# Patient Record
Sex: FEMALE | Race: WHITE | Employment: FULL TIME | URBAN - METROPOLITAN AREA
[De-identification: names, ages, dates, MRNs, and addresses within clinical notes are randomized per-mention and may not be internally consistent; named-entity substitution may affect disease eponyms.]

---

## 2022-07-12 ENCOUNTER — HOSPITAL ENCOUNTER (INPATIENT)
Age: 67
LOS: 9 days | Discharge: HOME | DRG: 907 | End: 2022-07-22
Attending: EMERGENCY MEDICINE | Admitting: FAMILY MEDICINE
Payer: COMMERCIAL

## 2022-07-12 ENCOUNTER — APPOINTMENT (OUTPATIENT)
Dept: CT IMAGING | Age: 67
DRG: 907 | End: 2022-07-12
Attending: EMERGENCY MEDICINE
Payer: COMMERCIAL

## 2022-07-12 DIAGNOSIS — S36.039D LACERATION OF SPLEEN, SUBSEQUENT ENCOUNTER: ICD-10-CM

## 2022-07-12 DIAGNOSIS — S36.039A SPLENIC LACERATION, INITIAL ENCOUNTER: Primary | ICD-10-CM

## 2022-07-12 DIAGNOSIS — Z98.890 STATUS POST COLONOSCOPY: ICD-10-CM

## 2022-07-12 DIAGNOSIS — R10.12 ABDOMINAL PAIN, LUQ (LEFT UPPER QUADRANT): ICD-10-CM

## 2022-07-12 DIAGNOSIS — I26.93 SINGLE SUBSEGMENTAL PULMONARY EMBOLISM WITHOUT ACUTE COR PULMONALE (HCC): ICD-10-CM

## 2022-07-12 LAB
ALBUMIN SERPL-MCNC: 3.1 G/DL (ref 3.5–5)
ALBUMIN/GLOB SERPL: 1.2 {RATIO} (ref 1.1–2.2)
ALP SERPL-CCNC: 50 U/L (ref 45–117)
ALT SERPL-CCNC: 17 U/L (ref 12–78)
ANION GAP SERPL CALC-SCNC: 8 MMOL/L (ref 5–15)
AST SERPL-CCNC: 12 U/L (ref 15–37)
BASOPHILS # BLD: 0.1 K/UL (ref 0–0.1)
BASOPHILS NFR BLD: 0 % (ref 0–1)
BILIRUB SERPL-MCNC: 0.6 MG/DL (ref 0.2–1)
BUN SERPL-MCNC: 10 MG/DL (ref 6–20)
BUN/CREAT SERPL: 10 (ref 12–20)
CALCIUM SERPL-MCNC: 7.7 MG/DL (ref 8.5–10.1)
CHLORIDE SERPL-SCNC: 106 MMOL/L (ref 97–108)
CO2 SERPL-SCNC: 24 MMOL/L (ref 21–32)
CREAT SERPL-MCNC: 0.99 MG/DL (ref 0.55–1.02)
DIFFERENTIAL METHOD BLD: ABNORMAL
EOSINOPHIL # BLD: 0 K/UL (ref 0–0.4)
EOSINOPHIL NFR BLD: 0 % (ref 0–7)
ERYTHROCYTE [DISTWIDTH] IN BLOOD BY AUTOMATED COUNT: 13.4 % (ref 11.5–14.5)
ERYTHROCYTE [DISTWIDTH] IN BLOOD BY AUTOMATED COUNT: 13.4 % (ref 11.5–14.5)
GLOBULIN SER CALC-MCNC: 2.6 G/DL (ref 2–4)
GLUCOSE SERPL-MCNC: 187 MG/DL (ref 65–100)
HCT VFR BLD AUTO: 30.2 % (ref 35–47)
HCT VFR BLD AUTO: 35.9 % (ref 35–47)
HGB BLD-MCNC: 10.3 G/DL (ref 11.5–16)
HGB BLD-MCNC: 12 G/DL (ref 11.5–16)
IMM GRANULOCYTES # BLD AUTO: 0.1 K/UL (ref 0–0.04)
IMM GRANULOCYTES NFR BLD AUTO: 0 % (ref 0–0.5)
LYMPHOCYTES # BLD: 1.3 K/UL (ref 0.8–3.5)
LYMPHOCYTES NFR BLD: 10 % (ref 12–49)
MAGNESIUM SERPL-MCNC: 1.9 MG/DL (ref 1.6–2.4)
MCH RBC QN AUTO: 32.7 PG (ref 26–34)
MCH RBC QN AUTO: 33.6 PG (ref 26–34)
MCHC RBC AUTO-ENTMCNC: 33.4 G/DL (ref 30–36.5)
MCHC RBC AUTO-ENTMCNC: 34.1 G/DL (ref 30–36.5)
MCV RBC AUTO: 97.8 FL (ref 80–99)
MCV RBC AUTO: 98.4 FL (ref 80–99)
MONOCYTES # BLD: 0.4 K/UL (ref 0–1)
MONOCYTES NFR BLD: 3 % (ref 5–13)
NEUTS SEG # BLD: 11.7 K/UL (ref 1.8–8)
NEUTS SEG NFR BLD: 87 % (ref 32–75)
NRBC # BLD: 0 K/UL (ref 0–0.01)
NRBC # BLD: 0 K/UL (ref 0–0.01)
NRBC BLD-RTO: 0 PER 100 WBC
NRBC BLD-RTO: 0 PER 100 WBC
PLATELET # BLD AUTO: 302 K/UL (ref 150–400)
PLATELET # BLD AUTO: 351 K/UL (ref 150–400)
PMV BLD AUTO: 9.2 FL (ref 8.9–12.9)
PMV BLD AUTO: 9.6 FL (ref 8.9–12.9)
POTASSIUM SERPL-SCNC: 3.8 MMOL/L (ref 3.5–5.1)
PROT SERPL-MCNC: 5.7 G/DL (ref 6.4–8.2)
RBC # BLD AUTO: 3.07 M/UL (ref 3.8–5.2)
RBC # BLD AUTO: 3.67 M/UL (ref 3.8–5.2)
SODIUM SERPL-SCNC: 138 MMOL/L (ref 136–145)
TROPONIN-HIGH SENSITIVITY: 8 NG/L (ref 0–51)
WBC # BLD AUTO: 11 K/UL (ref 3.6–11)
WBC # BLD AUTO: 13.5 K/UL (ref 3.6–11)

## 2022-07-12 PROCEDURE — 83735 ASSAY OF MAGNESIUM: CPT

## 2022-07-12 PROCEDURE — 96375 TX/PRO/DX INJ NEW DRUG ADDON: CPT

## 2022-07-12 PROCEDURE — 96374 THER/PROPH/DIAG INJ IV PUSH: CPT

## 2022-07-12 PROCEDURE — 84484 ASSAY OF TROPONIN QUANT: CPT

## 2022-07-12 PROCEDURE — 74011250636 HC RX REV CODE- 250/636: Performed by: EMERGENCY MEDICINE

## 2022-07-12 PROCEDURE — 74011250636 HC RX REV CODE- 250/636: Performed by: INTERNAL MEDICINE

## 2022-07-12 PROCEDURE — 74011000636 HC RX REV CODE- 636: Performed by: EMERGENCY MEDICINE

## 2022-07-12 PROCEDURE — 86900 BLOOD TYPING SEROLOGIC ABO: CPT

## 2022-07-12 PROCEDURE — 36415 COLL VENOUS BLD VENIPUNCTURE: CPT

## 2022-07-12 PROCEDURE — 80053 COMPREHEN METABOLIC PANEL: CPT

## 2022-07-12 PROCEDURE — 85027 COMPLETE CBC AUTOMATED: CPT

## 2022-07-12 PROCEDURE — 74177 CT ABD & PELVIS W/CONTRAST: CPT

## 2022-07-12 PROCEDURE — G0378 HOSPITAL OBSERVATION PER HR: HCPCS

## 2022-07-12 PROCEDURE — 93005 ELECTROCARDIOGRAM TRACING: CPT

## 2022-07-12 PROCEDURE — 85025 COMPLETE CBC W/AUTO DIFF WBC: CPT

## 2022-07-12 PROCEDURE — 99285 EMERGENCY DEPT VISIT HI MDM: CPT

## 2022-07-12 RX ORDER — MORPHINE SULFATE 4 MG/ML
4 INJECTION INTRAVENOUS
Status: COMPLETED | OUTPATIENT
Start: 2022-07-12 | End: 2022-07-12

## 2022-07-12 RX ORDER — HYDROMORPHONE HYDROCHLORIDE 1 MG/ML
0.5 INJECTION, SOLUTION INTRAMUSCULAR; INTRAVENOUS; SUBCUTANEOUS ONCE
Status: COMPLETED | OUTPATIENT
Start: 2022-07-12 | End: 2022-07-12

## 2022-07-12 RX ORDER — KETOROLAC TROMETHAMINE 30 MG/ML
15 INJECTION, SOLUTION INTRAMUSCULAR; INTRAVENOUS
Status: COMPLETED | OUTPATIENT
Start: 2022-07-12 | End: 2022-07-12

## 2022-07-12 RX ORDER — ONDANSETRON 2 MG/ML
4 INJECTION INTRAMUSCULAR; INTRAVENOUS ONCE
Status: COMPLETED | OUTPATIENT
Start: 2022-07-12 | End: 2022-07-12

## 2022-07-12 RX ORDER — OLMESARTAN MEDOXOMIL 20 MG/1
TABLET ORAL
COMMUNITY
Start: 2022-05-12

## 2022-07-12 RX ORDER — DOXYCYCLINE HYCLATE 20 MG
TABLET ORAL
COMMUNITY
Start: 2022-07-11 | End: 2022-07-22

## 2022-07-12 RX ORDER — HYDROCHLOROTHIAZIDE 25 MG/1
25 TABLET ORAL DAILY
COMMUNITY
Start: 2022-05-12 | End: 2022-07-22

## 2022-07-12 RX ORDER — MORPHINE SULFATE 2 MG/ML
2 INJECTION, SOLUTION INTRAMUSCULAR; INTRAVENOUS
Status: DISCONTINUED | OUTPATIENT
Start: 2022-07-12 | End: 2022-07-13

## 2022-07-12 RX ORDER — AMLODIPINE BESYLATE 5 MG/1
5 TABLET ORAL DAILY
COMMUNITY
Start: 2022-05-12

## 2022-07-12 RX ORDER — ACETAMINOPHEN 325 MG/1
650 TABLET ORAL
Status: DISCONTINUED | OUTPATIENT
Start: 2022-07-12 | End: 2022-07-13 | Stop reason: SDUPTHER

## 2022-07-12 RX ORDER — SODIUM CHLORIDE, SODIUM LACTATE, POTASSIUM CHLORIDE, CALCIUM CHLORIDE 600; 310; 30; 20 MG/100ML; MG/100ML; MG/100ML; MG/100ML
150 INJECTION, SOLUTION INTRAVENOUS CONTINUOUS
Status: DISCONTINUED | OUTPATIENT
Start: 2022-07-12 | End: 2022-07-16

## 2022-07-12 RX ADMIN — HYDROMORPHONE HYDROCHLORIDE 0.5 MG: 1 INJECTION, SOLUTION INTRAMUSCULAR; INTRAVENOUS; SUBCUTANEOUS at 22:21

## 2022-07-12 RX ADMIN — MORPHINE SULFATE 4 MG: 4 INJECTION INTRAVENOUS at 20:49

## 2022-07-12 RX ADMIN — SODIUM CHLORIDE 1000 ML: 9 INJECTION, SOLUTION INTRAVENOUS at 20:12

## 2022-07-12 RX ADMIN — KETOROLAC TROMETHAMINE 15 MG: 30 INJECTION, SOLUTION INTRAMUSCULAR; INTRAVENOUS at 20:14

## 2022-07-12 RX ADMIN — ONDANSETRON 4 MG: 2 INJECTION INTRAMUSCULAR; INTRAVENOUS at 20:13

## 2022-07-12 RX ADMIN — IOPAMIDOL 100 ML: 755 INJECTION, SOLUTION INTRAVENOUS at 21:05

## 2022-07-12 RX ADMIN — SODIUM CHLORIDE, POTASSIUM CHLORIDE, SODIUM LACTATE AND CALCIUM CHLORIDE 150 ML/HR: 600; 310; 30; 20 INJECTION, SOLUTION INTRAVENOUS at 23:42

## 2022-07-12 RX ADMIN — SODIUM CHLORIDE 500 ML: 9 INJECTION, SOLUTION INTRAVENOUS at 22:20

## 2022-07-12 NOTE — ED PROVIDER NOTES
69-year-old female presents from home via private vehicle with a complaint of left upper quadrant and left shoulder pain. Symptoms started around 3:00 today immediately after waking up from a colonoscopy. She complains of pain before going home. She states they gave her some pain medicine through the IV and something to drink but this did not seem to help with her symptoms. Pain is continued throughout the afternoon. She took Tylenol at home with no relief. She called the on-call GI doctor today here and she was advised to come to the emergency department. She states the pain is located in the left upper quadrant with radiation to her left shoulder and her left arm. Pain is worsened when she takes deep breath and also when she moves as well as sitting in certain positions. Never had pain like this before. Denies any fever, cough, shortness of breath. She has had nausea but no vomiting. No bowel movement since the colonoscopy. She states that she knows she had some polyps removed but does not know further details than that. Colonoscopy was done at [facility] by Dr. Marcia Ch. Past Medical History:   Diagnosis Date    Hypertension        History reviewed. No pertinent surgical history. History reviewed. No pertinent family history.     Social History     Socioeconomic History    Marital status:      Spouse name: Not on file    Number of children: Not on file    Years of education: Not on file    Highest education level: Not on file   Occupational History    Not on file   Tobacco Use    Smoking status: Current Every Day Smoker    Smokeless tobacco: Never Used   Substance and Sexual Activity    Alcohol use: Yes     Comment: most days    Drug use: Yes     Types: Marijuana    Sexual activity: Not on file   Other Topics Concern    Not on file   Social History Narrative    Not on file     Social Determinants of Health     Financial Resource Strain:     Difficulty of Paying Living Expenses: Not on file   Food Insecurity:     Worried About Running Out of Food in the Last Year: Not on file    Ran Out of Food in the Last Year: Not on file   Transportation Needs:     Lack of Transportation (Medical): Not on file    Lack of Transportation (Non-Medical): Not on file   Physical Activity:     Days of Exercise per Week: Not on file    Minutes of Exercise per Session: Not on file   Stress:     Feeling of Stress : Not on file   Social Connections:     Frequency of Communication with Friends and Family: Not on file    Frequency of Social Gatherings with Friends and Family: Not on file    Attends Yazidism Services: Not on file    Active Member of [address] [place] [address] [email or web address] or Organizations: Not on file    Attends Club or Organization Meetings: Not on file    Marital Status: Not on file   Intimate Partner Violence:     Fear of Current or Ex-Partner: Not on file    Emotionally Abused: Not on file    Physically Abused: Not on file    Sexually Abused: Not on file   Housing Stability:     Unable to Pay for Housing in the Last Year: Not on file    Number of [place] in the Last Year: Not on file    Unstable Housing in the Last Year: Not on file         ALLERGIES: Penicillins    Review of Systems   Constitutional: Negative for fever. HENT: Negative for facial swelling. Eyes: Negative for visual disturbance. Respiratory: Negative for chest tightness. Cardiovascular: Negative for chest pain. Gastrointestinal: Positive for abdominal pain. Genitourinary: Negative for difficulty urinating and dysuria. Musculoskeletal: Negative for arthralgias. Skin: Negative for rash. Neurological: Negative for headaches. Hematological: Negative for adenopathy. Psychiatric/Behavioral: Negative for suicidal ideas.        Vitals:    07/12/22 2019 07/12/22 2035 07/12/22 2039 07/12/22 2107   BP:  105/74  112/87   Pulse: 82 99 100 96   Resp: 18 22 19 21   Temp:       SpO2: 99% 100% 98% 99% Weight:       Height:                Physical Exam  Vitals and nursing note reviewed. Constitutional:       General: She is not in acute distress. Appearance: She is well-developed. HENT:      Head: Normocephalic and atraumatic. Eyes:      General: No scleral icterus. Conjunctiva/sclera: Conjunctivae normal.      Pupils: Pupils are equal, round, and reactive to light. Cardiovascular:      Rate and Rhythm: Normal rate. Heart sounds: No murmur heard. Pulmonary:      Effort: Pulmonary effort is normal. No respiratory distress. Abdominal:      General: There is no distension. Musculoskeletal:         General: Normal range of motion. Cervical back: Normal range of motion and neck supple. Skin:     General: Skin is warm and dry. Findings: No rash. Neurological:      Mental Status: She is alert and oriented to person, place, and time. MDM  Number of Diagnoses or Management Options     Amount and/or Complexity of Data Reviewed  Clinical lab tests: reviewed  Tests in the radiology section of CPT®: reviewed  Tests in the medicine section of CPT®: reviewed      ED Course as of 07/12/22 2218 Tue Jul 12, 2022 2012 EKG, 12 LEAD, INITIAL  ED EKG interpretation:  Rhythm: normal sinus rhythm. Rate (approx.): 91. Axis: left. ST segment:  No concerning ST elevations or depressions. This EKG was interpreted by Tejas Tate MD,ED Provider. [JM]   0582 I spoke to interventional radiology as well as general surgery. They are in agreement that at this point, the patient just needs observation and repeat hemoglobin checks. If she continues to be stable most likely no further intervention is needed. If she does decompensate or her hemoglobin drops, IR may be able to intervene. General surgery was in agreement with this plan. I will discuss admission with the hospitalist service.  [JM]      ED Course User Index  [JM] Sasha Samuels MD     Perfect Serve Consult for Admission  10:18 PM    ED Room Number: SER02/02  Patient Name and age:  Jeevan Amis 74 y.o.  female  Working Diagnosis:   1. Splenic laceration, initial encounter    2. Status post colonoscopy    3. Abdominal pain, LUQ (left upper quadrant)        COVID-19 Suspicion:  no  Sepsis present:  no  Reassessment needed: no  Code Status:  Full Code  Readmission: no  Isolation Requirements:  no  Recommended Level of Care:  telemetry  Department: [place] [facility] - [phone]  Admitting Provider:     Other:  Had colonoscopy done earlier today. Having LUQ and L shoulder pain since then. Small splenic lac on CT scan. Hgb initially 12->10. VS stable. I spoke with IR and surgery who agreed on inpatient observation and q6 hr hemoglobin checks. IR could intervene if she continues to bleed. Jose Alejandro Orozco said he will follow. Total critical care time spent exclusive of procedures:  35 minutes.     Procedures

## 2022-07-13 LAB
ABO + RH BLD: NORMAL
ATRIAL RATE: 91 BPM
BLOOD GROUP ANTIBODIES SERPL: NORMAL
CALCULATED P AXIS, ECG09: 62 DEGREES
CALCULATED R AXIS, ECG10: -69 DEGREES
CALCULATED T AXIS, ECG11: 81 DEGREES
DIAGNOSIS, 93000: NORMAL
ERYTHROCYTE [DISTWIDTH] IN BLOOD BY AUTOMATED COUNT: 13.6 % (ref 11.5–14.5)
HCT VFR BLD AUTO: 22.3 % (ref 35–47)
HCT VFR BLD AUTO: 29.4 % (ref 35–47)
HGB BLD-MCNC: 7.6 G/DL (ref 11.5–16)
HGB BLD-MCNC: 8.3 G/DL (ref 11.5–16)
HGB BLD-MCNC: 8.6 G/DL (ref 11.5–16)
HGB BLD-MCNC: 9.8 G/DL (ref 11.5–16)
MCH RBC QN AUTO: 32.8 PG (ref 26–34)
MCHC RBC AUTO-ENTMCNC: 33.3 G/DL (ref 30–36.5)
MCV RBC AUTO: 98.3 FL (ref 80–99)
NRBC # BLD: 0 K/UL (ref 0–0.01)
NRBC BLD-RTO: 0 PER 100 WBC
P-R INTERVAL, ECG05: 150 MS
PLATELET # BLD AUTO: 297 K/UL (ref 150–400)
PMV BLD AUTO: 9.6 FL (ref 8.9–12.9)
Q-T INTERVAL, ECG07: 334 MS
QRS DURATION, ECG06: 80 MS
QTC CALCULATION (BEZET), ECG08: 410 MS
RBC # BLD AUTO: 2.99 M/UL (ref 3.8–5.2)
SPECIMEN EXP DATE BLD: NORMAL
VENTRICULAR RATE, ECG03: 91 BPM
WBC # BLD AUTO: 8.8 K/UL (ref 3.6–11)

## 2022-07-13 PROCEDURE — 85027 COMPLETE CBC AUTOMATED: CPT

## 2022-07-13 PROCEDURE — G0378 HOSPITAL OBSERVATION PER HR: HCPCS

## 2022-07-13 PROCEDURE — 87040 BLOOD CULTURE FOR BACTERIA: CPT

## 2022-07-13 PROCEDURE — 74011250636 HC RX REV CODE- 250/636: Performed by: INTERNAL MEDICINE

## 2022-07-13 PROCEDURE — 96376 TX/PRO/DX INJ SAME DRUG ADON: CPT

## 2022-07-13 PROCEDURE — 36415 COLL VENOUS BLD VENIPUNCTURE: CPT

## 2022-07-13 PROCEDURE — 51798 US URINE CAPACITY MEASURE: CPT

## 2022-07-13 PROCEDURE — 74011000250 HC RX REV CODE- 250: Performed by: FAMILY MEDICINE

## 2022-07-13 PROCEDURE — 74011250636 HC RX REV CODE- 250/636: Performed by: SURGERY

## 2022-07-13 PROCEDURE — 74011250636 HC RX REV CODE- 250/636: Performed by: EMERGENCY MEDICINE

## 2022-07-13 PROCEDURE — 65660000001 HC RM ICU INTERMED STEPDOWN

## 2022-07-13 PROCEDURE — 85018 HEMOGLOBIN: CPT

## 2022-07-13 PROCEDURE — 99221 1ST HOSP IP/OBS SF/LOW 40: CPT | Performed by: NURSE PRACTITIONER

## 2022-07-13 PROCEDURE — 85014 HEMATOCRIT: CPT

## 2022-07-13 RX ORDER — ONDANSETRON 2 MG/ML
4 INJECTION INTRAMUSCULAR; INTRAVENOUS
Status: DISCONTINUED | OUTPATIENT
Start: 2022-07-13 | End: 2022-07-22 | Stop reason: HOSPADM

## 2022-07-13 RX ORDER — SODIUM CHLORIDE 0.9 % (FLUSH) 0.9 %
5-40 SYRINGE (ML) INJECTION AS NEEDED
Status: DISCONTINUED | OUTPATIENT
Start: 2022-07-13 | End: 2022-07-22 | Stop reason: HOSPADM

## 2022-07-13 RX ORDER — MORPHINE SULFATE 4 MG/ML
4 INJECTION INTRAVENOUS
Status: COMPLETED | OUTPATIENT
Start: 2022-07-13 | End: 2022-07-13

## 2022-07-13 RX ORDER — SODIUM CHLORIDE 0.9 % (FLUSH) 0.9 %
5-40 SYRINGE (ML) INJECTION EVERY 8 HOURS
Status: DISCONTINUED | OUTPATIENT
Start: 2022-07-13 | End: 2022-07-22 | Stop reason: HOSPADM

## 2022-07-13 RX ORDER — ACETAMINOPHEN 325 MG/1
650 TABLET ORAL
Status: DISCONTINUED | OUTPATIENT
Start: 2022-07-13 | End: 2022-07-22 | Stop reason: HOSPADM

## 2022-07-13 RX ORDER — HYDROMORPHONE HYDROCHLORIDE 1 MG/ML
1 INJECTION, SOLUTION INTRAMUSCULAR; INTRAVENOUS; SUBCUTANEOUS
Status: DISCONTINUED | OUTPATIENT
Start: 2022-07-13 | End: 2022-07-18

## 2022-07-13 RX ADMIN — HYDROMORPHONE HYDROCHLORIDE 1 MG: 1 INJECTION, SOLUTION INTRAMUSCULAR; INTRAVENOUS; SUBCUTANEOUS at 22:50

## 2022-07-13 RX ADMIN — MORPHINE SULFATE 2 MG: 2 INJECTION, SOLUTION INTRAMUSCULAR; INTRAVENOUS at 06:56

## 2022-07-13 RX ADMIN — SODIUM CHLORIDE, POTASSIUM CHLORIDE, SODIUM LACTATE AND CALCIUM CHLORIDE 150 ML/HR: 600; 310; 30; 20 INJECTION, SOLUTION INTRAVENOUS at 15:00

## 2022-07-13 RX ADMIN — HYDROMORPHONE HYDROCHLORIDE 1 MG: 1 INJECTION, SOLUTION INTRAMUSCULAR; INTRAVENOUS; SUBCUTANEOUS at 16:13

## 2022-07-13 RX ADMIN — SODIUM CHLORIDE, PRESERVATIVE FREE 10 ML: 5 INJECTION INTRAVENOUS at 21:13

## 2022-07-13 RX ADMIN — SODIUM CHLORIDE, POTASSIUM CHLORIDE, SODIUM LACTATE AND CALCIUM CHLORIDE 150 ML/HR: 600; 310; 30; 20 INJECTION, SOLUTION INTRAVENOUS at 21:13

## 2022-07-13 RX ADMIN — HYDROMORPHONE HYDROCHLORIDE 1 MG: 1 INJECTION, SOLUTION INTRAMUSCULAR; INTRAVENOUS; SUBCUTANEOUS at 10:55

## 2022-07-13 RX ADMIN — SODIUM CHLORIDE, POTASSIUM CHLORIDE, SODIUM LACTATE AND CALCIUM CHLORIDE 150 ML/HR: 600; 310; 30; 20 INJECTION, SOLUTION INTRAVENOUS at 06:00

## 2022-07-13 RX ADMIN — MORPHINE SULFATE 4 MG: 4 INJECTION INTRAVENOUS at 03:59

## 2022-07-13 RX ADMIN — MORPHINE SULFATE 2 MG: 2 INJECTION, SOLUTION INTRAMUSCULAR; INTRAVENOUS at 01:08

## 2022-07-13 RX ADMIN — SODIUM CHLORIDE, PRESERVATIVE FREE 10 ML: 5 INJECTION INTRAVENOUS at 16:14

## 2022-07-13 NOTE — ED NOTES
Pt up to ED restroom ambulatory accompanied by RNs x2. Patient reports increased pain and anxiety after returning to ED room. Pt reports she was able to urinate large amount when ambulatory to restroom. ED MD made aware of increased pain. Med order to follow.

## 2022-07-13 NOTE — ED NOTES
Bedside and Verbal shift change report given to Nirmal Riggs RN (oncoming nurse) by Josey Lilly RN (offgoing nurse). Report included the following information SBAR, ED Summary, Intake/Output and MAR.

## 2022-07-13 NOTE — H&P
[address] [facility] Adult  Hospitalist Group  History and Physical    Date of Service:  7/13/2022  Primary Care Provider: Arzella Carrel, MD  Source of information: The patient    Chief Complaint: Abdominal Pain      History of Presenting Illness:   David Hernandes is a 74 y.o. female who presents with abdominal pain    Patient had colonoscopy done yesterday, started experiencing left-sided abdominal pain with left shoulder pain, symptoms started around 3 PM, immediately after waking up from colonoscopy, patient continued to have nausea associated with abdominal pain, she took some Tylenol at home, symptoms did not jasper, called her GI doctor and came to the ER, patient came to [facility], had imaging done and was found to have splenic laceration and was requested to be admitted to the hospitalist service, denies any other complaints or problems    The patient denies any headache, blurry vision, sore throat, trouble swallowing, trouble with speech, chest pain, SOB, cough, fever, chills, N/V/D, , urinary symptoms, constipation, recent travels, sick contacts, focal or generalized neurological symptoms, falls, injuries, rashes, contact with COVID-19 diagnosed patients, hematemesis, melena, hemoptysis, hematuria, rashes, denies starting any new medications and denies any other concerns or problems besides as mentioned above. REVIEW OF SYSTEMS:  A comprehensive review of systems was negative except for that written in the History of Present Illness. Past Medical History:   Diagnosis Date    Hypertension       History reviewed. No pertinent surgical history. Prior to Admission medications    Medication Sig Start Date End Date Taking? Authorizing Provider   amLODIPine (NORVASC) 5 mg tablet Take 5 mg by mouth daily. 5/12/22   Other, MD Eros   doxycycline (PERIOSTAT) 20 mg tablet  7/11/22   Eros Christy MD   hydroCHLOROthiazide (HYDRODIURIL) 25 mg tablet Take 25 mg by mouth daily.  5/12/22   Other, MD Eros   olmesartan (BENICAR) 20 mg tablet TAKE 1 TABLET BY MOUTH EVERY DAY FOR 90 DAYS 5/12/22   Other, MD Eros     Allergies   Allergen Reactions    Penicillins Unknown (comments)      History reviewed. No pertinent family history. Social History:  reports that she has been smoking. She has never used smokeless tobacco. She reports current alcohol use. She reports current drug use. Drug: Marijuana. Family and social history were personally reviewed, all pertinent and relevant details are outlined as above. Objective:     Visit Vitals  BP 96/62 (BP 1 Location: Right upper arm, BP Patient Position: Lying)   Pulse 91   Temp 98.4 °F (36.9 °C)   Resp 22   Ht 5' 3\" (1.6 m)   Wt 78.4 kg (172 lb 13.5 oz)   SpO2 95%   BMI 30.62 kg/m²    O2 Flow Rate (L/min): 2 l/min O2 Device: Nasal cannula    PHYSICAL EXAM:   General: Alert x oriented x 3, awake,  HEENT: PEERL, EOMI, moist mucus membranes  Neck: Supple, no JVD, no meningeal signs  Chest: Clear to auscultation bilaterally   CVS: RRR, S1 S2 heard, no murmurs/rubs/gallops  Abd: Soft, tender to palpation diffusely/no rebound/no guarding  Ext: No clubbing, no cyanosis, no edema  Neuro/Psych: Pleasant mood and affect, CN 2-12 grossly intact, sensory grossly within normal limit, Strength 5/5 in all extremities, DTR 1+ x 4  Cap refill: Brisk, less than 3 seconds  Pulses: 2+, symmetric in all extremities  Skin: Warm, dry, without rashes or lesions    Data Review: All diagnostic labs and studies have been reviewed. Abnormal Labs Reviewed   CBC WITH AUTOMATED DIFF - Abnormal; Notable for the following components:       Result Value    WBC 13.5 (*)     RBC 3.67 (*)     NEUTROPHILS 87 (*)     LYMPHOCYTES 10 (*)     MONOCYTES 3 (*)     ABS. NEUTROPHILS 11.7 (*)     ABS. IMM.  GRANS. 0.1 (*)     All other components within normal limits   METABOLIC PANEL, COMPREHENSIVE - Abnormal; Notable for the following components:    Glucose 187 (*)     BUN/Creatinine ratio 10 (*) GFR est non-AA 56 (*)     Calcium 7.7 (*)     AST (SGOT) 12 (*)     Protein, total 5.7 (*)     Albumin 3.1 (*)     All other components within normal limits   CBC W/O DIFF - Abnormal; Notable for the following components:    RBC 3.07 (*)     HGB 10.3 (*)     HCT 30.2 (*)     All other components within normal limits   CBC W/O DIFF - Abnormal; Notable for the following components:    RBC 2.99 (*)     HGB 9.8 (*)     HCT 29.4 (*)     All other components within normal limits   HEMOGLOBIN - Abnormal; Notable for the following components:    HGB 8.6 (*)     All other components within normal limits       All Micro Results     Procedure Component Value Units Date/Time    CULTURE, BLOOD, PAIRED [[id number]]     Order Status: Sent Specimen: Blood           IMAGING:   CT ABD PELV W CONT   Final Result   Small laceration in the anterior inferior spleen with active extravasation   extending into the left upper quadrant. There is a moderate amount of blood in   the abdomen that appears most dense adjacent to the spleen. No free air to   suggest colonic perforation. The findings were called to Dr. Bon Cruz on 7/12/2022 at 9:40 PM by myself. [id number]                 ECG/ECHO:    Results for orders placed or performed during the hospital encounter of 07/12/22   EKG, 12 LEAD, INITIAL   Result Value Ref Range    Ventricular Rate 91 BPM    Atrial Rate 91 BPM    P-R Interval 150 ms    QRS Duration 80 ms    Q-T Interval 334 ms    QTC Calculation (Bezet) 410 ms    Calculated P Axis 62 degrees    Calculated R Axis -69 degrees    Calculated T Axis 81 degrees    Diagnosis       Normal sinus rhythm with sinus arrhythmia  Possible Left atrial enlargement  Left axis deviation  Pulmonary disease pattern  Abnormal ECG  No previous ECGs available          Assessment:   Given the patient's current clinical presentation, there is a high level of concern for decompensation if discharged from the emergency department.  Complex decision making was performed, which includes reviewing the patient's available past medical records, laboratory results, and imaging studies. Splenic laceration  Acute anemia  Hypertension now hypotensive  Hypocalcemia  Plan:   Patient will be admitted on a telemetry bed, keep n.p.o., pain control, general surgery on board, monitor H&H, hemoglobin trending down, may need surgical intervention, defer to surgery, reassess as needed  Likely secondary to acute blood loss, transfuse as needed, may consider additional imaging if needed, monitor H&H and further intervention per hospital course, avoid antiplatelets  Patient with soft blood pressures, likely secondary to above, IV hydration, monitor, transfuse and optimize blood pressure as needed, hold all antihypertensives  Corrected calcium borderline low, will get ionized calcium level        DIET: DIET NPO   ISOLATION PRECAUTIONS: There are currently no Active Isolations  CODE STATUS: Full Code   DVT PROPHYLAXIS: SCDs  FUNCTIONAL STATUS PRIOR TO HOSPITALIZATION: Fully active and ambulatory; able to carry on all self-care without restriction. EARLY MOBILITY ASSESSMENT: Recommend routine ambulation while hospitalized with the assistance of nursing staff  ANTICIPATED DISCHARGE: 24-48 hours. CRITICAL CARE WAS PERFORMED FOR THIS ENCOUNTER: YES. I had a face to face encounter with the patient, reviewed and interpreted patient data including clinical events, labs, images, vital signs, I/O's, and examined patient. I have discussed the case and the plan and management of the patient's care with the consulting services, the bedside nurses and necessary ancillary providers. This patient has a high probability of imminent, clinically significant deterioration, which requires the highest level of preparedness to intervene urgently.  I participated in the decision-making and personally managed or directed the management of the following life and organ supporting interventions that required my frequent assessment to treat or prevent imminent deterioration. I personally spent 40 minutes of critical care time. This is time spent at this critically ill patient's bedside actively involved in patient care as well as the coordination of care and discussions with the patient's family. This does not include any procedural time which has been billed separately. Signed By: Maddy Ibarra MD     July 13, 2022         Please note that this dictation may have been completed with Dragon, the computer voice recognition software. Quite often unanticipated grammatical, syntax, homophones, and other interpretive errors are inadvertently transcribed by the computer software. Please disregard these errors. Please excuse any errors that have escaped final proofreading.

## 2022-07-13 NOTE — PROGRESS NOTES
Medicare Outpatient Observation Notice (MOON) provided to patient/representative with verbal explanation of the notice. Time allotted for questions regarding the notice. Patient /representative provided a completed copy of the MOON notice. Copy placed on bedside chart.   Marine Madrigal, Care Management Assistant

## 2022-07-13 NOTE — CONSULTS
INTERVENTIONAL RADIOLOGY  Consult Note      Patient:  Alexys Akbar  :  1955  Age:  74 y.o. MRN:  [id number]    Today's Date:  2022  Admission Date:  2022  Hospital Day:  0  Consult requested by:  Rhys Montana MD      CC / HPI   Alexys Akbar is a 74 y.o. female with a history of HTN and anxiety who presented to the ED with LUQ abdominal pain and left shoulder pain. Patient states symptoms began yesterday afternoon immediately following colonoscopy and continued throughout the evening. She spoke with the on-call GI physician and was advised to go to the ED for further evaluation and management. Pain is aggravated by deep breaths and movement. She endorses nausea but denies vomiting. Work-up with findings of small splenic laceration with extravasation into the LUQ. IR consultation is requested for possible embolization. PAST MEDICAL HISTORY  Past Medical History:   Diagnosis Date    Hypertension      PAST SURGICAL HISTORY  History reviewed. No pertinent surgical history. SOCIAL HISTORY  Social History     Socioeconomic History    Marital status:      Spouse name: Not on file    Number of children: Not on file    Years of education: Not on file    Highest education level: Not on file   Occupational History    Not on file   Tobacco Use    Smoking status: Current Every Day Smoker    Smokeless tobacco: Never Used   Substance and Sexual Activity    Alcohol use: Yes     Comment: most days    Drug use: Yes     Types: Marijuana    Sexual activity: Not on file   Other Topics Concern    Not on file   Social History Narrative    Not on file     Social Determinants of Health     Financial Resource Strain:     Difficulty of Paying Living Expenses: Not on file   Food Insecurity:     Worried About Running Out of Food in the Last Year: Not on file    Simin of Food in the Last Year: Not on file   Transportation Needs:     Lack of Transportation (Medical):  Not on file    Lack of Transportation (Non-Medical): Not on file   Physical Activity:     Days of Exercise per Week: Not on file    Minutes of Exercise per Session: Not on file   Stress:     Feeling of Stress : Not on file   Social Connections:     Frequency of Communication with Friends and Family: Not on file    Frequency of Social Gatherings with Friends and Family: Not on file    Attends Judaism Services: Not on file    Active Member of [address] [place] [address] or Organizations: Not on file    Attends Club or Organization Meetings: Not on file    Marital Status: Not on file   Intimate Partner Violence:     Fear of Current or Ex-Partner: Not on file    Emotionally Abused: Not on file    Physically Abused: Not on file    Sexually Abused: Not on file   Housing Stability:     Unable to Pay for Housing in the Last Year: Not on file    Number of [place] in the Last Year: Not on file    Unstable Housing in the Last Year: Not on file     FAMILY HISTORY  History reviewed. No pertinent family history.     CURRENT MEDICATIONS  Current Facility-Administered Medications   Medication Dose Route Frequency Provider Last Rate Last Admin    HYDROmorphone (DILAUDID) injection 1 mg  1 mg IntraVENous Q3H PRN Arslan Yarbrough MD   1 mg at 07/13/22 1055    ondansetron (ZOFRAN) injection 4 mg  4 mg IntraVENous Q4H PRN Arslan Yarbrough MD        sodium chloride (NS) flush 5-40 mL  5-40 mL IntraVENous Q8H Twan Whitehead MD        sodium chloride (NS) flush 5-40 mL  5-40 mL IntraVENous PRN Twan Whitehead MD        acetaminophen (TYLENOL) tablet 650 mg  650 mg Oral Q4H PRN Twan Whitehead MD        lactated Ringers infusion  150 mL/hr IntraVENous CONTINUOUS Jonel Camargo  mL/hr at 07/13/22 0600 150 mL/hr at 07/13/22 0600     ALLERGIES  Allergies   Allergen Reactions    Penicillins Unknown (comments)       DIAGNOSTIC STUDIES   IMAGING STUDIES  I personally reviewed the following imaging studies and reports:    CT Results (most recent):  Results from Hospital Encounter encounter on 07/12/22    CT ABD PELV W CONT    Narrative  EXAM: CT ABD PELV W CONT    INDICATION: LUQ abd pain, s/p colonoscopy today    COMPARISON: None    CONTRAST: 100 mL of Isovue-370. ORAL CONTRAST: None    TECHNIQUE:  Following the uneventful intravenous administration of contrast, thin axial  images were obtained through the abdomen and pelvis. Coronal and sagittal  reconstructions were generated. CT dose reduction was achieved through use of a  standardized protocol tailored for this examination and automatic exposure  control for dose modulation. FINDINGS:  LOWER THORAX: No significant abnormality in the incidentally imaged lower chest.  LIVER: No mass. BILIARY TREE: Gallbladder is within normal limits. CBD is not dilated. SPLEEN: There is a small area of hypodensity in the inferior anterior portion of  the spleen likely related to laceration. There is active extravasation extending  from this into the adjacent fluid as seen on series 3 image 27. The fluid  surrounding the spleen is hypodense related to hemorrhage. PANCREAS: No mass or ductal dilatation. ADRENALS: Unremarkable. KIDNEYS: No mass, calculus, or hydronephrosis. STOMACH: Unremarkable. SMALL BOWEL: No dilatation or wall thickening. COLON: No dilatation or wall thickening. Several colonic diverticula are  present. There appears to be a fat plane between the splenic flexure and the  adjacent blood in the left upper quadrant. No evidence of free air to suggest  perforation. APPENDIX: Not well seen. PERITONEUM: There is a moderate amount of fluid in the abdomen, greatest in the  left upper quadrant surrounding the spleen. Bright contrast is seen adjacent to  the spleen related to active bleeding. RETROPERITONEUM: No lymphadenopathy or aortic aneurysm. REPRODUCTIVE ORGANS: Unremarkable  URINARY BLADDER: No mass or calculus. BONES: No destructive bone lesion.   ABDOMINAL WALL: No mass or hernia. ADDITIONAL COMMENTS: N/A    Impression  Small laceration in the anterior inferior spleen with active extravasation  extending into the left upper quadrant. There is a moderate amount of blood in  the abdomen that appears most dense adjacent to the spleen. No free air to  suggest colonic perforation.     LABS  Lab Results   Component Value Date/Time    WBC 8.8 07/13/2022 03:51 AM    HGB 8.6 (L) 07/13/2022 11:05 AM    HCT 29.4 (L) 07/13/2022 03:51 AM    PLATELET 179 90/10/8657 03:51 AM    MCV 98.3 07/13/2022 03:51 AM     Lab Results   Component Value Date/Time    Sodium 138 07/12/2022 08:04 PM    Potassium 3.8 07/12/2022 08:04 PM    Chloride 106 07/12/2022 08:04 PM    CO2 24 07/12/2022 08:04 PM    Anion gap 8 07/12/2022 08:04 PM    Glucose 187 (H) 07/12/2022 08:04 PM    BUN 10 07/12/2022 08:04 PM    Creatinine 0.99 07/12/2022 08:04 PM    BUN/Creatinine ratio 10 (L) 07/12/2022 08:04 PM    GFR est AA >60 07/12/2022 08:04 PM    GFR est non-AA 56 (L) 07/12/2022 08:04 PM    Calcium 7.7 (L) 07/12/2022 08:04 PM     No results found for: INR, PTMR, PTP, PT1, PT2, INREXT       REVIEW OF SYSTEMS   (Positive findings are bolded, all others negative)  Constitutional:  Fever, Chills, Night sweats, Unintentional weight loss, Weight gain, Anorexia, Fatigue, Somnolence  Eyes:  Vision loss, Vision change, Eye pain, Redness, Discharge  ENT:  Otalgia, Otorrhea, Hearing loss, Tinnitus, Epistaxis, Rhinorrhea, Sinus Congestion, Sore throat, Oral lesions, Tooth pain, Bleeding gums, Dysphonia, Dysphagia, Neck pain  Cardiovascular:  Chest pain, Palpitations, Dyspnea on exertion, Orthopnea, Paroxysmal nocturnal dyspnea, Leg swelling, Claudication  Respiratory:  Cough, Sputum production, Hemoptysis, Wheezing, Snoring, Shortness of breath  Gastrointestinal:  Nausea, Vomiting, Abdominal pain, Dyspepsia, Diarrhea, Constipation, Hematochezia, Melena, Encopresis  Genitourinary:  Pelvic pain, Dysuria, Frequency, Urgency, Hematuria, Retention, Incontinence, Testicular pain, Scrotal mass / Swelling, Erectile dysfunction, Menorrhagia, Metrorrhagia, Postmenopausal bleeding, Dysmenorrhea, Discharge  Musculoskeletal:  Back pain, Joint pain, Joint swelling, Muscle pain, Muscle spasm  Integumentary:  Rash, Pruritus, Dryness, Discoloration, Non-healing sores / Open wounds, Breast lumps, Mastalgia, Galactorrhea, Alopecia  Neurological:  Headache, Weakness, Paresthesias, Memory loss, Seizures, Dizziness, Syncope, Ataxia, Tremor  Psychiatric:  Anxiety, Depression, Irritability, Insomnia, Paranoia, Hallucinations, Suicidal ideations  Endocrine:  Heat / Cold intolerance, Polydipsia, Polyphagia  Hematologic / Lymphatic:  Lymphadenopathy, Bleeding disorder  Allergic / Immunologic / Infectious:  Urticaria, Seasonal / Environmental allergies, Persistent / Recurrent infection      PHYSICAL EXAM   BP 96/62 (BP 1 Location: Right upper arm, BP Patient Position: Lying)   Pulse (!) 115   Temp 98.4 °F (36.9 °C)   Resp 22   Ht 5' 3\" (1.6 m)   Wt 78.4 kg (172 lb 13.5 oz)   SpO2 95%   BMI 30.62 kg/m²   General:  Calm, cooperative, NAD  HEENT:  NCAT, EOMI, conjunctiva clear, MMM  Heart:  RRR, S1S2 normal  Lungs:  CTAB, NWOB  Abdomen:  Soft, ND, LUQ TTP  Extremities:  MAEW, no cyanosis or edema  Skin:  Warm and dry, color normal, no rashes  Neurological:  AAOX3, speech clear and coherent      ASSESSMENT / PLAN   This is a 74 y.o. female with small splenic laceration following colonoscopy. Vitals appear stable  Long discussion with patient who would prefer to avoid invasive procedure if possible  Conservative management for now  Continue to trend H/H  Notify IR for any precipitous drop in Hgb or decline in clinical condition      Case discussed with Dr. Douglas Woodall. Thank you for asking us to participate in the care of this patient.       Mirta Rosales., USMAN  [facility]      CC:  Ken Miner MD

## 2022-07-13 NOTE — ED NOTES
Bedside shift change report given to [facility] (oncoming nurse) by Gretel Grubbs RN (offgoing nurse). Report included the following information SBAR, ED Summary, Intake/Output, MAR and Recent Results.

## 2022-07-13 NOTE — CONSULTS
[facility]  Inpatient Consultation        Admit Date: 7/12/2022  Reason for Consultation: splenic bleeding    HPI:  Yenny Elam is a 74 y.o. female whom we are asked to see in consultation by Dr. Gifty Whittington for the above complaint. Presented to [facility] ED with complaints of Left sided abdominal pain with Left shoulder pain. Symptoms started around 3:00pm yesterday immediately after waking up from a colonoscopy. She complained of pain before going home. She states they gave her some pain medicine through the IV and something to drink but this did not seem to help with her symptoms. Pain continued throughout the afternoon. She took Tylenol at home with no relief. She called the on-call GI doctor and she was advised to come to the emergency department. She states the pain is located in the left upper quadrant with radiation to her left shoulder and her left arm. Pain is worsened when she takes deep breath and also when she moves as well as sitting in certain positions. Never had pain like this before. Denies any fever, cough, shortness of breath. She has had nausea but no vomiting. No bowel movement since the colonoscopy. Colonoscopy was done at [facility] by Dr. Jamison Silverman on 7-12-22, where 8 polyps were removed one needed cautery. IMPRESSION- CT scan  Small laceration in the anterior inferior spleen with active extravasation  extending into the left upper quadrant. There is a moderate amount of blood in  the abdomen that appears most dense adjacent to the spleen. No free air to  suggest colonic perforation. Patient Active Problem List    Diagnosis Date Noted    Splenic laceration 07/12/2022     Past Medical History:   Diagnosis Date    Hypertension       History reviewed. No pertinent surgical history.    Social History     Tobacco Use    Smoking status: Current Every Day Smoker    Smokeless tobacco: Never Used   Substance Use Topics    Alcohol use: Yes     Comment: most days      History reviewed. No pertinent family history. Prior to Admission medications    Medication Sig Start Date End Date Taking? Authorizing Provider   amLODIPine (NORVASC) 5 mg tablet Take 5 mg by mouth daily. 22   OtherEros MD   doxycycline (PERIOSTAT) 20 mg tablet  22   Eros Christy MD   hydroCHLOROthiazide (HYDRODIURIL) 25 mg tablet Take 25 mg by mouth daily. 22   Eros Christy MD   olmesartan (BENICAR) 20 mg tablet TAKE 1 TABLET BY MOUTH EVERY DAY FOR 90 DAYS 22   Other, MD Eros     Current Facility-Administered Medications   Medication Dose Route Frequency    acetaminophen (TYLENOL) tablet 650 mg  650 mg Oral Q4H PRN    morphine injection 2 mg  2 mg IntraVENous Q4H PRN    lactated Ringers infusion  150 mL/hr IntraVENous CONTINUOUS     Allergies   Allergen Reactions    Penicillins Unknown (comments)          Subjective:     Review of Systems:    A comprehensive review of systems was negative except for that written in the History of Present Illness. Objective:     Blood pressure 104/69, pulse 89, temperature 98.5 °F (36.9 °C), resp. rate (!) 31, height 5' 3\" (1.6 m), weight 172 lb 13.5 oz (78.4 kg), SpO2 98 %. Temp (24hrs), Av.6 °F (37 °C), Min:98.5 °F (36.9 °C), Max:98.7 °F (37.1 °C)      Recent Labs     22  0351 22  2207 22   WBC 8.8 11.0 13.5*   HGB 9.8* 10.3* 12.0   HCT 29.4* 30.2* 35.9    302 351     Recent Labs     22      K 3.8      CO2 24   *   BUN 10   CREA 0.99   CA 7.7*   MG 1.9   ALB 3.1*   TBILI 0.6   ALT 17     No results for input(s): AML, LPSE in the last 72 hours. Intake/Output Summary (Last 24 hours) at 2022 1031  Last data filed at 2022 2343  Gross per 24 hour   Intake 1500 ml   Output --   Net 1500 ml       _____________________  Physical Exam:     General:  Alert, cooperative, no distress, appears stated age. Eyes:   Sclera clear. Throat: Lips, mucosa, and tongue normal.   Neck: Supple, symmetrical, trachea midline. Lungs:   Expiratory wheezes upper lobes   Heart:  Regular rate and rhythm. Abdomen:   Normal BS, flat, Distended and tender to light palpation worsened with a deep breath. Extremities: Extremities normal, atraumatic, no cyanosis or edema. Skin: Skin color, texture, turgor normal. No rashes or lesions. Assessment:   Active Problems:    Splenic laceration (7/12/2022)      Plan:   Continue IVFs  Sips of clear liquids  Serial H&H's-IR to see as well  IS hourly  Ambulate and OOB to chair      Thank you for allowing us to participate in the care of this patient. Total time spent with patient: 30 minutes. Signed By: Patience Alvarez     July 13, 2022        I agree with the assessment and plan by the student  Serial H/H (every 6hrs) and IR consult        ADDENDUM:  Lawence Rinne, MD  Pt was seen and examined. Pt was consulted for splenic hemorrhage after colonoscopy. Pt had likely a grade I tear from the splenic from the tension on the splenocolic ligament during colonoscopy. Pt is reporting pain with inspiration. No acute indication for operation. Recommend serial hemoglobin checks and pt possible need for IR coil embolization of the splenic artery if acute anemia worsen with hemodynamic changes. Diet as tolerated. Pain control.

## 2022-07-13 NOTE — ED NOTES
TRANSFER - OUT REPORT:    Verbal report given to Deb Zapien(name) on Diana Landeros  being transferred to [facility]) for routine progression of care       Report consisted of patients Situation, Background, Assessment and   Recommendations(SBAR). Information from the following report(s) SBAR was reviewed with the receiving nurse. Lines:   Peripheral IV 07/12/22 Left Antecubital (Active)        Opportunity for questions and clarification was provided.       Patient transported with:   Monitor  O2 @ 2 liters

## 2022-07-13 NOTE — ED NOTES
Pt awake and talking on cell phone with spouse at this time. Pt updated on ETA of transport and room number at [facility].

## 2022-07-14 ENCOUNTER — APPOINTMENT (OUTPATIENT)
Dept: CT IMAGING | Age: 67
DRG: 907 | End: 2022-07-14
Attending: SURGERY
Payer: COMMERCIAL

## 2022-07-14 ENCOUNTER — APPOINTMENT (OUTPATIENT)
Dept: CT IMAGING | Age: 67
DRG: 907 | End: 2022-07-14
Attending: STUDENT IN AN ORGANIZED HEALTH CARE EDUCATION/TRAINING PROGRAM
Payer: COMMERCIAL

## 2022-07-14 LAB
ALBUMIN SERPL-MCNC: 2.6 G/DL (ref 3.5–5)
ALBUMIN/GLOB SERPL: 1 {RATIO} (ref 1.1–2.2)
ALP SERPL-CCNC: 35 U/L (ref 45–117)
ALT SERPL-CCNC: 14 U/L (ref 12–78)
ANION GAP SERPL CALC-SCNC: 6 MMOL/L (ref 5–15)
AST SERPL-CCNC: 9 U/L (ref 15–37)
BASOPHILS # BLD: 0.1 K/UL (ref 0–0.1)
BASOPHILS NFR BLD: 1 % (ref 0–1)
BILIRUB SERPL-MCNC: 0.6 MG/DL (ref 0.2–1)
BUN SERPL-MCNC: 11 MG/DL (ref 6–20)
BUN/CREAT SERPL: 17 (ref 12–20)
CALCIUM SERPL-MCNC: 8 MG/DL (ref 8.5–10.1)
CHLORIDE SERPL-SCNC: 108 MMOL/L (ref 97–108)
CO2 SERPL-SCNC: 24 MMOL/L (ref 21–32)
CREAT SERPL-MCNC: 0.64 MG/DL (ref 0.55–1.02)
DIFFERENTIAL METHOD BLD: ABNORMAL
EOSINOPHIL # BLD: 0.1 K/UL (ref 0–0.4)
EOSINOPHIL NFR BLD: 2 % (ref 0–7)
ERYTHROCYTE [DISTWIDTH] IN BLOOD BY AUTOMATED COUNT: 13.4 % (ref 11.5–14.5)
GLOBULIN SER CALC-MCNC: 2.7 G/DL (ref 2–4)
GLUCOSE SERPL-MCNC: 100 MG/DL (ref 65–100)
HCT VFR BLD AUTO: 21.5 % (ref 35–47)
HCT VFR BLD AUTO: 22.4 % (ref 35–47)
HCT VFR BLD AUTO: 23.1 % (ref 35–47)
HGB BLD-MCNC: 7.2 G/DL (ref 11.5–16)
HGB BLD-MCNC: 7.7 G/DL (ref 11.5–16)
HGB BLD-MCNC: 7.7 G/DL (ref 11.5–16)
IMM GRANULOCYTES # BLD AUTO: 0 K/UL (ref 0–0.04)
IMM GRANULOCYTES NFR BLD AUTO: 0 % (ref 0–0.5)
LYMPHOCYTES # BLD: 2 K/UL (ref 0.8–3.5)
LYMPHOCYTES NFR BLD: 24 % (ref 12–49)
MCH RBC QN AUTO: 33.3 PG (ref 26–34)
MCHC RBC AUTO-ENTMCNC: 33.3 G/DL (ref 30–36.5)
MCV RBC AUTO: 100 FL (ref 80–99)
MONOCYTES # BLD: 0.7 K/UL (ref 0–1)
MONOCYTES NFR BLD: 9 % (ref 5–13)
NEUTS SEG # BLD: 5.5 K/UL (ref 1.8–8)
NEUTS SEG NFR BLD: 64 % (ref 32–75)
NRBC # BLD: 0 K/UL (ref 0–0.01)
NRBC BLD-RTO: 0 PER 100 WBC
PLATELET # BLD AUTO: 222 K/UL (ref 150–400)
PMV BLD AUTO: 9.7 FL (ref 8.9–12.9)
POTASSIUM SERPL-SCNC: 3.7 MMOL/L (ref 3.5–5.1)
PROT SERPL-MCNC: 5.3 G/DL (ref 6.4–8.2)
RBC # BLD AUTO: 2.31 M/UL (ref 3.8–5.2)
SODIUM SERPL-SCNC: 138 MMOL/L (ref 136–145)
WBC # BLD AUTO: 8.5 K/UL (ref 3.6–11)

## 2022-07-14 PROCEDURE — 74011000636 HC RX REV CODE- 636: Performed by: RADIOLOGY

## 2022-07-14 PROCEDURE — 65660000001 HC RM ICU INTERMED STEPDOWN

## 2022-07-14 PROCEDURE — 99231 SBSQ HOSP IP/OBS SF/LOW 25: CPT | Performed by: SURGERY

## 2022-07-14 PROCEDURE — 74011000636 HC RX REV CODE- 636: Performed by: STUDENT IN AN ORGANIZED HEALTH CARE EDUCATION/TRAINING PROGRAM

## 2022-07-14 PROCEDURE — 85018 HEMOGLOBIN: CPT

## 2022-07-14 PROCEDURE — 74011250637 HC RX REV CODE- 250/637: Performed by: STUDENT IN AN ORGANIZED HEALTH CARE EDUCATION/TRAINING PROGRAM

## 2022-07-14 PROCEDURE — 74011250636 HC RX REV CODE- 250/636: Performed by: INTERNAL MEDICINE

## 2022-07-14 PROCEDURE — 80053 COMPREHEN METABOLIC PANEL: CPT

## 2022-07-14 PROCEDURE — 74174 CTA ABD&PLVS W/CONTRAST: CPT

## 2022-07-14 PROCEDURE — 74011250637 HC RX REV CODE- 250/637: Performed by: FAMILY MEDICINE

## 2022-07-14 PROCEDURE — 85025 COMPLETE CBC W/AUTO DIFF WBC: CPT

## 2022-07-14 PROCEDURE — 36415 COLL VENOUS BLD VENIPUNCTURE: CPT

## 2022-07-14 PROCEDURE — 71275 CT ANGIOGRAPHY CHEST: CPT

## 2022-07-14 PROCEDURE — 74011000250 HC RX REV CODE- 250: Performed by: FAMILY MEDICINE

## 2022-07-14 RX ORDER — IBUPROFEN 200 MG
1 TABLET ORAL DAILY
Status: DISCONTINUED | OUTPATIENT
Start: 2022-07-14 | End: 2022-07-22 | Stop reason: HOSPADM

## 2022-07-14 RX ORDER — BUTALBITAL, ACETAMINOPHEN AND CAFFEINE 50; 325; 40 MG/1; MG/1; MG/1
1 TABLET ORAL
Status: DISCONTINUED | OUTPATIENT
Start: 2022-07-14 | End: 2022-07-22 | Stop reason: HOSPADM

## 2022-07-14 RX ADMIN — ACETAMINOPHEN 650 MG: 325 TABLET ORAL at 04:05

## 2022-07-14 RX ADMIN — ACETAMINOPHEN 650 MG: 325 TABLET ORAL at 23:57

## 2022-07-14 RX ADMIN — SODIUM CHLORIDE, PRESERVATIVE FREE 10 ML: 5 INJECTION INTRAVENOUS at 22:37

## 2022-07-14 RX ADMIN — BUTALBITAL, ACETAMINOPHEN, AND CAFFEINE 1 TABLET: 50; 325; 40 TABLET ORAL at 20:32

## 2022-07-14 RX ADMIN — SODIUM CHLORIDE, PRESERVATIVE FREE 10 ML: 5 INJECTION INTRAVENOUS at 06:20

## 2022-07-14 RX ADMIN — SODIUM CHLORIDE, POTASSIUM CHLORIDE, SODIUM LACTATE AND CALCIUM CHLORIDE 150 ML/HR: 600; 310; 30; 20 INJECTION, SOLUTION INTRAVENOUS at 20:09

## 2022-07-14 RX ADMIN — IOPAMIDOL 100 ML: 755 INJECTION, SOLUTION INTRAVENOUS at 10:41

## 2022-07-14 RX ADMIN — ACETAMINOPHEN 650 MG: 325 TABLET ORAL at 09:45

## 2022-07-14 RX ADMIN — IOPAMIDOL 70 ML: 755 INJECTION, SOLUTION INTRAVENOUS at 16:09

## 2022-07-14 RX ADMIN — BUTALBITAL, ACETAMINOPHEN, AND CAFFEINE 1 TABLET: 50; 325; 40 TABLET ORAL at 12:24

## 2022-07-14 RX ADMIN — SODIUM CHLORIDE, POTASSIUM CHLORIDE, SODIUM LACTATE AND CALCIUM CHLORIDE 150 ML/HR: 600; 310; 30; 20 INJECTION, SOLUTION INTRAVENOUS at 04:05

## 2022-07-14 NOTE — PROGRESS NOTES
[address] [facility]  Hospitalist Group                                                                                          Hospitalist Progress Note  Rosie Harding MD  Answering service: [phone] OR 36 from in house phone        Date of Service:  2022  NAME:  Jenny Lopez  :  1955  MRN:  [id number]      Admission Summary:   HPI: Bhaskar Whalen is a 74 y.o. female who presents with abdominal pain     Patient had colonoscopy done yesterday, started experiencing left-sided abdominal pain with left shoulder pain, symptoms started around 3 PM, immediately after waking up from colonoscopy, patient continued to have nausea associated with abdominal pain, she took some Tylenol at home, symptoms did not jasper, called her GI doctor and came to the ER, patient came to [facility], had imaging done and was found to have splenic laceration and was requested to be admitted to the hospitalist service, denies any other complaints or problems\"       Interval history / Subjective:   Seen examined at bedside. Abdominal pain improved, was a bit frustated at bedside but easily pacified when being talked to about plan      Assessment & Plan:     Splenic Lac  Acute blood loss anemia  Acute PE?  LLL  Hypocalcemia   -appreciate surgery and IR, no acute  intervention at this point cont supportive care   -CTA abdomen pelvis showed so improvement in lac and peritoneum however noted possible left lower lobe PE?  -Obtain CTA of the chest stat, discussed with surgery may need IVC filter if confirmed PE, would be indicated to start ac given splenic lac   -Continue to trend H&H's, transfuse for hemoglobin less than 7  -ca improved s/p full mentation continue to monitor  -follow cultures     updated over the phone       Code status: full   Prophylaxis: [address] discussed with: patient/family, nurse, consulted  Anticipated Disposition: > 48 hrs      Hospital Problems  Never Reviewed Codes Class Noted POA    Splenic laceration ICD-10-CM: T36.474G  ICD-9-CM: 865.09  7/12/2022 Unknown                Review of Systems:   A comprehensive review of systems was negative except for that written in the HPI. Vital Signs:    Last 24hrs VS reviewed since prior progress note. Most recent are:  Visit Vitals  /68 (BP 1 Location: Right upper arm, BP Patient Position: At rest)   Pulse 99   Temp 98.3 °F (36.8 °C)   Resp 18   Ht 5' 3\" (1.6 m)   Wt 75 kg (165 lb 5.5 oz)   SpO2 98%   BMI 29.29 kg/m²         Intake/Output Summary (Last 24 hours) at 7/14/2022 1529  Last data filed at 7/14/2022 1311  Gross per 24 hour   Intake --   Output 750 ml   Net -750 ml        Physical Examination:     I had a face to face encounter with this patient and independently examined them on 7/14/2022 as outlined below:          Constitutional:  No acute distress, cooperative, pleasant    ENT:  Oral mucosa moist, oropharynx benign. Resp:  CTA bilaterally. No wheezing/rhonchi/rales. No accessory muscle use. CV:  Regular rhythm, normal rate, no murmurs, gallops, rubs    GI:  Soft, non distended, non tender. normoactive bowel sounds, no hepatosplenomegaly     Musculoskeletal:  No edema, warm, 2+ pulses throughout    Neurologic:  Moves all extremities. AAOx3, CN II-XII reviewed            Data Review:    Review and/or order of clinical lab test  Review and/or order of tests in the radiology section of CPT  Review and/or order of tests in the medicine section of CPT      Labs:     Recent Labs     07/14/22 0423 07/13/22  2220 07/13/22  1105 07/13/22  0351   WBC 8.5  --   --  8.8   HGB 7.7* 7.6*   < > 9.8*   HCT 23.1* 22.3*  --  29.4*     --   --  297    < > = values in this interval not displayed.      Recent Labs     07/14/22 0423 07/12/22 2004    138   K 3.7 3.8    106   CO2 24 24   BUN 11 10   CREA 0.64 0.99    187*   CA 8.0* 7.7*   MG  --  1.9     Recent Labs     07/14/22  042 07/12/22 2004   ALT 14 17   AP 35* 50   TBILI 0.6 0.6   TP 5.3* 5.7*   ALB 2.6* 3.1*   GLOB 2.7 2.6     No results for input(s): INR, PTP, APTT, INREXT in the last 72 hours. No results for input(s): FE, TIBC, PSAT, FERR in the last 72 hours. No results found for: FOL, RBCF   No results for input(s): PH, PCO2, PO2 in the last 72 hours. No results for input(s): CPK, CKNDX, TROIQ in the last 72 hours.     No lab exists for component: CPKMB  No results found for: CHOL, CHOLX, CHLST, CHOLV, HDL, HDLP, LDL, LDLC, DLDLP, TGLX, TRIGL, TRIGP, CHHD, CHHDX  No results found for: GLUCPOC  No results found for: COLOR, APPRN, SPGRU, REFSG, FELY, PROTU, GLUCU, KETU, BILU, UROU, KAITLIN, LEUKU, GLUKE, EPSU, BACTU, WBCU, RBCU, CASTS, UCRY      Medications Reviewed:     Current Facility-Administered Medications   Medication Dose Route Frequency    butalbital-acetaminophen-caffeine (FIORICET, ESGIC) -40 mg per tablet 1 Tablet  1 Tablet Oral Q4H PRN    HYDROmorphone (DILAUDID) injection 1 mg  1 mg IntraVENous Q3H PRN    ondansetron (ZOFRAN) injection 4 mg  4 mg IntraVENous Q4H PRN    sodium chloride (NS) flush 5-40 mL  5-40 mL IntraVENous Q8H    sodium chloride (NS) flush 5-40 mL  5-40 mL IntraVENous PRN    acetaminophen (TYLENOL) tablet 650 mg  650 mg Oral Q4H PRN    lactated Ringers infusion  150 mL/hr IntraVENous CONTINUOUS     ______________________________________________________________________  EXPECTED LENGTH OF STAY: 2d 4h  ACTUAL LENGTH OF STAY:          1                 Sunil Ruiz MD

## 2022-07-14 NOTE — PROGRESS NOTES
Progress Note    Patient: Deonte Boothe MRN: [id number]  SSN: [id number]    YOB: 1955  Age: 74 y.o. Sex: female      Admit Date: 2022    Splenic laceration    Subjective:     No acute surgical issues. Pt noted to have ongoing downward trend in hemoglobin. CTA was performed which showed no active bleeding from splenic laceration. A left PE was noted on CT scan. Pt overall reported she is doing better today than yesterday. Objective:     Visit Vitals  /68 (BP 1 Location: Right upper arm, BP Patient Position: At rest)   Pulse 99   Temp 98.3 °F (36.8 °C)   Resp 18   Ht 5' 3\" (1.6 m)   Wt 165 lb 5.5 oz (75 kg)   SpO2 98%   BMI 29.29 kg/m²       Temp (24hrs), Av.6 °F (37 °C), Min:98.3 °F (36.8 °C), Max:98.9 °F (37.2 °C)      Physical Exam:    Gen:  NAD  Pulm:  Unlabored  Abd:  S/ND/mild TTP in LUQ    Recent Results (from the past 24 hour(s))   HEMOGLOBIN    Collection Time: 22  4:18 PM   Result Value Ref Range    HGB 8.3 (L) 11.5 - 16.0 g/dL   HGB & HCT    Collection Time: 22 10:20 PM   Result Value Ref Range    HGB 7.6 (L) 11.5 - 16.0 g/dL    HCT 22.3 (L) 35.0 - 78.3 %   METABOLIC PANEL, COMPREHENSIVE    Collection Time: 22  4:23 AM   Result Value Ref Range    Sodium 138 136 - 145 mmol/L    Potassium 3.7 3.5 - 5.1 mmol/L    Chloride 108 97 - 108 mmol/L    CO2 24 21 - 32 mmol/L    Anion gap 6 5 - 15 mmol/L    Glucose 100 65 - 100 mg/dL    BUN 11 6 - 20 MG/DL    Creatinine 0.64 0.55 - 1.02 MG/DL    BUN/Creatinine ratio 17 12 - 20      GFR est AA >60 >60 ml/min/1.73m2    GFR est non-AA >60 >60 ml/min/1.73m2    Calcium 8.0 (L) 8.5 - 10.1 MG/DL    Bilirubin, total 0.6 0.2 - 1.0 MG/DL    ALT (SGPT) 14 12 - 78 U/L    AST (SGOT) 9 (L) 15 - 37 U/L    Alk.  phosphatase 35 (L) 45 - 117 U/L    Protein, total 5.3 (L) 6.4 - 8.2 g/dL    Albumin 2.6 (L) 3.5 - 5.0 g/dL    Globulin 2.7 2.0 - 4.0 g/dL    A-G Ratio 1.0 (L) 1.1 - 2.2     CBC WITH AUTOMATED DIFF    Collection Time: 07/14/22  4:23 AM   Result Value Ref Range    WBC 8.5 3.6 - 11.0 K/uL    RBC 2.31 (L) 3.80 - 5.20 M/uL    HGB 7.7 (L) 11.5 - 16.0 g/dL    HCT 23.1 (L) 35.0 - 47.0 %    .0 (H) 80.0 - 99.0 FL    MCH 33.3 26.0 - 34.0 PG    MCHC 33.3 30.0 - 36.5 g/dL    RDW 13.4 11.5 - 14.5 %    PLATELET 547 934 - 692 K/uL    MPV 9.7 8.9 - 12.9 FL    NRBC 0.0 0  WBC    ABSOLUTE NRBC 0.00 0.00 - 0.01 K/uL    NEUTROPHILS 64 32 - 75 %    LYMPHOCYTES 24 12 - 49 %    MONOCYTES 9 5 - 13 %    EOSINOPHILS 2 0 - 7 %    BASOPHILS 1 0 - 1 %    IMMATURE GRANULOCYTES 0 0.0 - 0.5 %    ABS. NEUTROPHILS 5.5 1.8 - 8.0 K/UL    ABS. LYMPHOCYTES 2.0 0.8 - 3.5 K/UL    ABS. MONOCYTES 0.7 0.0 - 1.0 K/UL    ABS. EOSINOPHILS 0.1 0.0 - 0.4 K/UL    ABS. BASOPHILS 0.1 0.0 - 0.1 K/UL    ABS. IMM. GRANS. 0.0 0.00 - 0.04 K/UL    DF AUTOMATED           Assessment:     Hospital Problems  Never Reviewed          Codes Class Noted POA    Splenic laceration ICD-10-CM: O60.942X  ICD-9-CM: 865.09  7/12/2022 Unknown              Plan/Recommendations/Medical Decision Making:     - Splenic laceration:  No acute indication for operation. CTA showed no active bleeding. She will need serial hemoglobin checks and likely blood transfusion if hemoglobin continues to drop  - CTA of chest to evaluate for extent of PE  - As she has splenic hemorrhage, anticoagulation will be contraindicated. Will need IVC filter placed.   Awaiting results of chest CTA  - NPO with ice chips for now

## 2022-07-14 NOTE — PROGRESS NOTES
INTERVENTIONAL RADIOLOGY  Progress Note      Patient:  Aly Nicole  :  1955  Age:  74 y.o. MRN:  [id number]    Today's Date:  2022  Admission Date:  2022  Hospital Day:  1      SUBJECTIVE   Aly Nicole is a 74 y.o. female with a history of small splenic laceration following colonoscopy. Patient reports abdominal pain improved. CTA a/p today redemonstrates parenchymal laceration at the anterior inferior spleen; however, bleeding appears to have resolved. New incidental finding of left lower lobe pulmonary embolus. OBJECTIVE   IMAGING STUDIES  I personally reviewed the following imaging studies:    CT Results:  Results from Hospital Encounter encounter on 22    CTA ABD PELV W WO CONT    Narrative  EXAM:  CTA ABD PELV W WO CONT    INDICATION: Evaluate for splenic hemorrhage    COMPARISON: CT abdomen/pelvis dated 2022. CONTRAST: 100 mL of Isovue-370    TECHNIQUE:  Multislice helical CT arteriography was performed from the diaphragm to the  symphysis pubis during uneventful rapid bolus intravenous contrast  administration. Precontrast images of the abdomen and pelvis were also acquired  No oral contrast was administered. Contiguous 2.5 mm axial images were  reconstructed and lung and soft tissue windows were generated. Coronal and  sagittal reformations and 3-D shaded surface display renderings were generated. CT dose reduction was achieved through use of a standardized protocol tailored  for this examination and automatic exposure control for dose modulation. Coronal  maximum intensity projection images were also acquired. CTA FINDINGS:  Redemonstrated laceration at the anterior inferior spleen with moderate sized  perisplenic hematoma. Previously seen active bleeding has resolved in the  interval. Blood products extend from the left upper quadrant and around the  paracolic gutter into the pelvis.  Small amount of blood products in the right  paracolic gutter as well.  No abdominal aortic aneurysm. Celiac and SMA are patent. Replaced right hepatic artery noted arising from the  SMA. Patent renal arteries. Patent LEMUEL. Bilateral iliac and common femoral  arteries are patent. Bilateral internal iliac arteries are patent. OTHER FINDINGS:  VISUALIZED THORAX: There is a small PE in a left lower lobe pulmonary artery  (series 6 image 1). Trace pleural effusion on the right with atelectasis of the  right lower lobe and to a lesser extent left lower lobe. LIVER: No mass. BILIARY TREE: Hyperdense material within the gallbladder likely represents  vicarious contrast excretion. CBD is prominent in size. SPLEEN: As above. PANCREAS: No mass or ductal dilatation. ADRENALS: Small right adrenal nodule measuring 7.6 Hounsfield units, likely  adenoma. KIDNEYS: No mass, calculus, or hydronephrosis. STOMACH: Unremarkable. SMALL BOWEL: No dilatation or wall thickening. COLON: No dilatation or wall thickening. Clips noted in the right colon. APPENDIX: Not well seen. PERITONEUM: Hemoperitoneum as above. RETROPERITONEUM: No lymphadenopathy or aortic aneurysm. REPRODUCTIVE ORGANS: No abnormal adnexal mass seen. URINARY BLADDER: No mass or calculus. BONES: No destructive bone lesion. ABDOMINAL WALL: No mass or hernia. ADDITIONAL COMMENTS: N/A    Impression  1. Redemonstrated parenchymal laceration at the anterior inferior spleen. Previously demonstrated active bleeding has resolved in the interval. Overall,  the volume of hemoperitoneum is slightly decreased in the interval.  2.  Likely new pulmonary embolus in the left lower lobe. Recommend dedicated  chest CTA for more complete evaluation.     LABS  Lab Results   Component Value Date/Time    WBC 8.5 07/14/2022 04:23 AM    HGB 7.7 (L) 07/14/2022 04:23 AM    HCT 23.1 (L) 07/14/2022 04:23 AM    PLATELET 071 44/44/0690 04:23 AM    .0 (H) 07/14/2022 04:23 AM     Lab Results   Component Value Date/Time    Sodium 138 07/14/2022 04:23 AM    Potassium 3.7 07/14/2022 04:23 AM    Chloride 108 07/14/2022 04:23 AM    CO2 24 07/14/2022 04:23 AM    Anion gap 6 07/14/2022 04:23 AM    Glucose 100 07/14/2022 04:23 AM    BUN 11 07/14/2022 04:23 AM    Creatinine 0.64 07/14/2022 04:23 AM    BUN/Creatinine ratio 17 07/14/2022 04:23 AM    GFR est AA >60 07/14/2022 04:23 AM    GFR est non-AA >60 07/14/2022 04:23 AM    Calcium 8.0 (L) 07/14/2022 04:23 AM     No results found for: INR, PTMR, PTP, PT1, PT2, INREXT    PHYSICAL EXAM  /68 (BP 1 Location: Right upper arm, BP Patient Position: At rest)   Pulse 99   Temp 98.3 °F (36.8 °C)   Resp 18   Ht 5' 3\" (1.6 m)   Wt 75 kg (165 lb 5.5 oz)   SpO2 98%   BMI 29.29 kg/m²   General:  NAD  Heart:  RRR  Lungs:  NWOB  Abdomen:  Soft, ND, LUQ mildly TTP  Neurological:  AAOX3      ASSESSMENT / PLAN   Continue to trend H/H  Dedicated CTA chest recommended  May need IVC filter      Lauren Wiggins NP  [facility]

## 2022-07-14 NOTE — PROGRESS NOTES
Transition of Care Plan  RUR 10%    Interventional Radiology Consulted     Disposition   Home with     Transportation       Medical follow up   PCP and specialist    Contact   Mariam Colby  [phone]    Reason for Admission:  Splenic Laceration--abdominal pain  Hx colonoscopy 7/12/22  , hypertension                   RUR Score:     19%                Plan for utilizing home health:      Not indicated   Will arrange if ordered  No hx of [facility]    PCP: First and Last name:  Indra Aguilar MD     Name of Practice:    Are you a current patient: Yes/No: yes   Approximate date of last visit: recently    Can you participate in a virtual visit with your PCP: yes                    Current Advanced Directive/Advance Care Plan: Full Code      Healthcare Decision Maker:   Click here to complete [address] including selection of the Healthcare Decision Maker Relationship (ie \"Primary\")                          Transition of Care Plan:   Home with  and medical follow up    CM talked with  and he confirmed demographics, PCP and insurance-- Medicare Part A-- [address] through employer. No preferred pharmacy    Patient and  live in two level home-- patient was self care, driving, working full time and using no dme prior to admission. The couple have 2 children-- son in [place]-- daughter out of town  Patient is an  for [employer]     Patient plans to return home when discharged. CM to follow to assist with any needs that arise. Care Management Interventions  Mode of Transport at Discharge:  (car )  Transition of Care Consult (CM Consult):  Other  Discharge Durable Medical Equipment: No  Physical Therapy Consult: No  Occupational Therapy Consult: No  Speech Therapy Consult: No  Support Systems: Other (Comment),Spouse/Significant Other,Child(jose luis),Other Family Member(s)  Confirm Follow Up Transport: Family (self )  Discharge Location  Patient Expects to be Discharged to[de-identified] Home

## 2022-07-14 NOTE — PROGRESS NOTES
Patient upgraded to Inpatient status. Medicare pt has received, reviewed, and signed 1st IM letter informing them of their right to appeal the discharge. Signed copy has been placed on pt bedside chart.

## 2022-07-14 NOTE — PROGRESS NOTES
Physician Progress Note      Harmony Cross  CSN #:                  [id number]  :                       1955  ADMIT DATE:       2022 7:47 PM  [address] [place] DATE:  RESPONDING  PROVIDER #:        Jaron Mittal MD          QUERY TEXT:    Pt admitted with splenic laceration and underwent colonoscopy .? If possible, please document in progress notes and discharge summary:    The medical record reflects the following:  Risk Factors: 67yo s/p colonoscopy    Clinical Indicators:  ED  Small splenic lac on CT scan. Hgb initially 12->10.    7 General Surgery  Pt was consulted for splenic hemorrhage after colonoscopy. Pt had likely a grade I tear from the splenic from the tension on the splenocolic ligament during colonoscopy. Treatment: serial hemoglobin checks, Radiology consulted, General Surgery following    Thank you,  Arlene Oliva  [phone]  I am also available via [employer]. Options provided:  -- Splenic laceration?is a?postoperative complication  -- Splenic laceration  is not a postoperative complication  -- Splenic laceration is not a postoperative complication, but is due to other incidental risk factor, Please specify other incidental risk factor. -- Other - I will add my own diagnosis  -- Disagree - Not applicable / Not valid  -- Disagree - Clinically unable to determine / Unknown  -- Refer to Clinical Documentation Reviewer    PROVIDER RESPONSE TEXT:    Patient has Splenic laceration as a?postoperative complication.     Query created by: Komal Resendez on 2022 9:55 AM      Electronically signed by:  Jaron Mittal MD 2022 6:15 PM

## 2022-07-15 ENCOUNTER — HOSPITAL ENCOUNTER (INPATIENT)
Dept: INTERVENTIONAL RADIOLOGY/VASCULAR | Age: 67
Discharge: HOME | DRG: 907 | End: 2022-07-15
Attending: STUDENT IN AN ORGANIZED HEALTH CARE EDUCATION/TRAINING PROGRAM
Payer: COMMERCIAL

## 2022-07-15 VITALS
RESPIRATION RATE: 31 BRPM | HEART RATE: 93 BPM | OXYGEN SATURATION: 100 % | SYSTOLIC BLOOD PRESSURE: 144 MMHG | DIASTOLIC BLOOD PRESSURE: 95 MMHG

## 2022-07-15 LAB
ANION GAP SERPL CALC-SCNC: 6 MMOL/L (ref 5–15)
BASOPHILS # BLD: 0.1 K/UL (ref 0–0.1)
BASOPHILS NFR BLD: 1 % (ref 0–1)
BUN SERPL-MCNC: 2 MG/DL (ref 6–20)
BUN/CREAT SERPL: 4 (ref 12–20)
CALCIUM SERPL-MCNC: 8.4 MG/DL (ref 8.5–10.1)
CHLORIDE SERPL-SCNC: 107 MMOL/L (ref 97–108)
CO2 SERPL-SCNC: 27 MMOL/L (ref 21–32)
CREAT SERPL-MCNC: 0.47 MG/DL (ref 0.55–1.02)
DIFFERENTIAL METHOD BLD: ABNORMAL
EOSINOPHIL # BLD: 0.2 K/UL (ref 0–0.4)
EOSINOPHIL NFR BLD: 2 % (ref 0–7)
ERYTHROCYTE [DISTWIDTH] IN BLOOD BY AUTOMATED COUNT: 13.2 % (ref 11.5–14.5)
GLUCOSE SERPL-MCNC: 107 MG/DL (ref 65–100)
HCT VFR BLD AUTO: 24.3 % (ref 35–47)
HGB BLD-MCNC: 8.2 G/DL (ref 11.5–16)
IMM GRANULOCYTES # BLD AUTO: 0 K/UL (ref 0–0.04)
IMM GRANULOCYTES NFR BLD AUTO: 0 % (ref 0–0.5)
LYMPHOCYTES # BLD: 1.7 K/UL (ref 0.8–3.5)
LYMPHOCYTES NFR BLD: 21 % (ref 12–49)
MCH RBC QN AUTO: 33.6 PG (ref 26–34)
MCHC RBC AUTO-ENTMCNC: 33.7 G/DL (ref 30–36.5)
MCV RBC AUTO: 99.6 FL (ref 80–99)
MONOCYTES # BLD: 0.5 K/UL (ref 0–1)
MONOCYTES NFR BLD: 7 % (ref 5–13)
NEUTS SEG # BLD: 5.6 K/UL (ref 1.8–8)
NEUTS SEG NFR BLD: 69 % (ref 32–75)
NRBC # BLD: 0 K/UL (ref 0–0.01)
NRBC BLD-RTO: 0 PER 100 WBC
PLATELET # BLD AUTO: 242 K/UL (ref 150–400)
PMV BLD AUTO: 9.4 FL (ref 8.9–12.9)
POTASSIUM SERPL-SCNC: 3.4 MMOL/L (ref 3.5–5.1)
RBC # BLD AUTO: 2.44 M/UL (ref 3.8–5.2)
SODIUM SERPL-SCNC: 140 MMOL/L (ref 136–145)
WBC # BLD AUTO: 8.1 K/UL (ref 3.6–11)

## 2022-07-15 PROCEDURE — C1769 GUIDE WIRE: HCPCS

## 2022-07-15 PROCEDURE — 37191 INS ENDOVAS VENA CAVA FILTR: CPT

## 2022-07-15 PROCEDURE — 85025 COMPLETE CBC W/AUTO DIFF WBC: CPT

## 2022-07-15 PROCEDURE — C1880 VENA CAVA FILTER: HCPCS

## 2022-07-15 PROCEDURE — 65660000001 HC RM ICU INTERMED STEPDOWN

## 2022-07-15 PROCEDURE — 36415 COLL VENOUS BLD VENIPUNCTURE: CPT

## 2022-07-15 PROCEDURE — 06H03DZ INSERTION OF INTRALUMINAL DEVICE INTO INFERIOR VENA CAVA, PERCUTANEOUS APPROACH: ICD-10-PCS | Performed by: RADIOLOGY

## 2022-07-15 PROCEDURE — 99232 SBSQ HOSP IP/OBS MODERATE 35: CPT | Performed by: SURGERY

## 2022-07-15 PROCEDURE — 74011000636 HC RX REV CODE- 636: Performed by: RADIOLOGY

## 2022-07-15 PROCEDURE — 74011250636 HC RX REV CODE- 250/636: Performed by: INTERNAL MEDICINE

## 2022-07-15 PROCEDURE — 77030011229 HC DIL VESL COON COOK -A

## 2022-07-15 PROCEDURE — 80048 BASIC METABOLIC PNL TOTAL CA: CPT

## 2022-07-15 PROCEDURE — 2709999900 HC NON-CHARGEABLE SUPPLY

## 2022-07-15 PROCEDURE — 74011250637 HC RX REV CODE- 250/637: Performed by: STUDENT IN AN ORGANIZED HEALTH CARE EDUCATION/TRAINING PROGRAM

## 2022-07-15 PROCEDURE — 74011000250 HC RX REV CODE- 250: Performed by: RADIOLOGY

## 2022-07-15 PROCEDURE — 74011000250 HC RX REV CODE- 250: Performed by: FAMILY MEDICINE

## 2022-07-15 PROCEDURE — 74011250636 HC RX REV CODE- 250/636: Performed by: STUDENT IN AN ORGANIZED HEALTH CARE EDUCATION/TRAINING PROGRAM

## 2022-07-15 PROCEDURE — C1894 INTRO/SHEATH, NON-LASER: HCPCS

## 2022-07-15 PROCEDURE — 74011636637 HC RX REV CODE- 636/637: Performed by: SURGERY

## 2022-07-15 RX ORDER — LIDOCAINE HYDROCHLORIDE 20 MG/ML
20 INJECTION, SOLUTION INFILTRATION; PERINEURAL
Status: COMPLETED | OUTPATIENT
Start: 2022-07-15 | End: 2022-07-15

## 2022-07-15 RX ORDER — LANOLIN ALCOHOL/MO/W.PET/CERES
3 CREAM (GRAM) TOPICAL
Status: DISCONTINUED | OUTPATIENT
Start: 2022-07-15 | End: 2022-07-22 | Stop reason: HOSPADM

## 2022-07-15 RX ORDER — SUMATRIPTAN 6 MG/.5ML
6 INJECTION, SOLUTION SUBCUTANEOUS ONCE
Status: COMPLETED | OUTPATIENT
Start: 2022-07-15 | End: 2022-07-15

## 2022-07-15 RX ORDER — POTASSIUM CHLORIDE 14.9 MG/ML
10 INJECTION INTRAVENOUS
Status: DISPENSED | OUTPATIENT
Start: 2022-07-15 | End: 2022-07-15

## 2022-07-15 RX ADMIN — SUMATRIPTAN 6 MG: 6 INJECTION SUBCUTANEOUS at 10:46

## 2022-07-15 RX ADMIN — Medication 3 MG: at 21:15

## 2022-07-15 RX ADMIN — SODIUM CHLORIDE, POTASSIUM CHLORIDE, SODIUM LACTATE AND CALCIUM CHLORIDE 150 ML/HR: 600; 310; 30; 20 INJECTION, SOLUTION INTRAVENOUS at 21:15

## 2022-07-15 RX ADMIN — SODIUM CHLORIDE, PRESERVATIVE FREE 10 ML: 5 INJECTION INTRAVENOUS at 05:17

## 2022-07-15 RX ADMIN — POTASSIUM CHLORIDE 10 MEQ: 14.9 INJECTION, SOLUTION INTRAVENOUS at 09:00

## 2022-07-15 RX ADMIN — LIDOCAINE HYDROCHLORIDE 4 ML: 20 INJECTION, SOLUTION INFILTRATION; PERINEURAL at 16:36

## 2022-07-15 RX ADMIN — IOPAMIDOL 30 ML: 755 INJECTION, SOLUTION INTRAVENOUS at 16:37

## 2022-07-15 RX ADMIN — SODIUM CHLORIDE, PRESERVATIVE FREE 10 ML: 5 INJECTION INTRAVENOUS at 14:00

## 2022-07-15 RX ADMIN — POTASSIUM CHLORIDE 10 MEQ: 14.9 INJECTION, SOLUTION INTRAVENOUS at 10:17

## 2022-07-15 RX ADMIN — BUTALBITAL, ACETAMINOPHEN, AND CAFFEINE 1 TABLET: 50; 325; 40 TABLET ORAL at 17:10

## 2022-07-15 RX ADMIN — SODIUM CHLORIDE, POTASSIUM CHLORIDE, SODIUM LACTATE AND CALCIUM CHLORIDE 150 ML/HR: 600; 310; 30; 20 INJECTION, SOLUTION INTRAVENOUS at 03:32

## 2022-07-15 RX ADMIN — POTASSIUM CHLORIDE 10 MEQ: 14.9 INJECTION, SOLUTION INTRAVENOUS at 11:58

## 2022-07-15 RX ADMIN — SODIUM CHLORIDE, PRESERVATIVE FREE 10 ML: 5 INJECTION INTRAVENOUS at 21:15

## 2022-07-15 NOTE — PROGRESS NOTES
1645: TRANSFER - OUT REPORT:    Verbal report given to Feroz Watts RN(name) on Kyler Wyman  being transferred to [facility]) for routine post - op       Report consisted of patients Situation, Background, Assessment and   Recommendations(SBAR). Information from the following report(s) Procedure Summary, MAR and Alarm Parameters  was reviewed with the receiving nurse. Lines:   Peripheral IV 07/12/22 Left Antecubital (Active)   Site Assessment Clean, dry, & intact 07/15/22 1200   Phlebitis Assessment 0 07/15/22 1200   Infiltration Assessment 0 07/15/22 1200   Dressing Status Clean, dry, & intact 07/15/22 1200   Dressing Type Transparent;Tape 07/15/22 1200   Hub Color/Line Status Pink; Infusing 07/15/22 1200   Action Taken Open ports on tubing capped 07/15/22 1200   Alcohol Cap Used Yes 07/15/22 1200        Opportunity for questions and clarification was provided.       Patient transported with:   CME

## 2022-07-15 NOTE — PROGRESS NOTES
Surgery Progress Note    7/15/2022    Admit Date: 7/12/2022    CC: Abd pain    Subjective:     Improving abdominal pain. Bad headache. No nausea. Constitutional: No fever or chills  Neurologic: Headache  Eyes: No scleral icterus or irritated eyes  Nose: No nasal pain or drainage  Mouth: No oral lesions or sore throat  Cardiac: No palpations or chest pain  Pulmonary: No cough or shortness of breath  Gastrointestinal: Abd pain, No nausea, emesis, diarrhea, or constipation  Genitourinary: No dysuria  Musculoskeletal: No muscle or joint tenderness  Skin: No rashes or lesions  Psychiatric: No anxiety or depressed mood    Objective:     Visit Vitals  /82   Pulse 82   Temp 98.9 °F (37.2 °C)   Resp 18   Ht 5' 3\" (1.6 m)   Wt 174 lb 2.6 oz (79 kg)   SpO2 100%   BMI 30.85 kg/m²       General: No acute distress, conversant  Eyes: PERRLA, no scleral icterus  HENT: Normocephalic without oral lesions  Neck: Trachea midline without LAD  Cardiac: Normal pulse rate and rhythm  Pulmonary: Symmetric chest rise with normal effort  GI: Soft, mild tenderness in LUQ, ND, no hernia, no splenomegaly  Skin: Warm without rash  Extremities: No edema or joint stiffness  Psych: Appropriate mood and affect    Labs, vital signs, and I/O reviewed. Assessment:     73 y/o F s/p splenic injury after colonoscopy with PE    Plan:     Abd not acute  Okay for full liquids after IVCF  Hgb stable  IVCF today  Imitrex ordered for headache.     Army Amanda MD  Bariatric and General Surgeon  [facility]

## 2022-07-15 NOTE — PROGRESS NOTES
Name of procedure:  ERICA       Vital Signs: stable        Pt tolerated procedure well. VSS. No C/O pain. Dressing to site D&I. No bleeding or hematoma noted to site. Discharge instructions given. Copy on chart and copy given to pt. Pt verbalized understanding. Pt taken to car by wheelchair and taken home by family. NAD noted at time of discharge.

## 2022-07-15 NOTE — PROGRESS NOTES
Spiritual Care Assessment/Progress Note  [facility]      NAME: Marcell Hernández      MRN: [id number]  AGE: 74 y.o. SEX: female  Scientology Affiliation:    Language: English     7/15/2022     Total Time (in minutes): 22     Spiritual Assessment begun in [facility] 4 IMCU 2 through conversation with:         [x]Patient        [x] Family    [] Friend(s)        Reason for Consult: Request by patient     Spiritual beliefs: (Please include comment if needed)     [x] Identifies with a daisy tradition: Scientology        [] Supported by a daisy community:            [] Claims no spiritual orientation:           [] Seeking spiritual identity:                [] Adheres to an individual form of spirituality:           [] Not able to assess:                           Identified resources for coping:      [x] Prayer                               [] Music                  [] Guided Imagery     [x] Family/friends                 [] Pet visits     [] Devotional reading                         [] Unknown     [x] Other: Sacramental Support                                             Interventions offered during this visit: (See comments for more details)    Patient Interventions: Affirmation of emotions/emotional suffering,Initial/Spiritual assessment, patient floor,Iconic (affirming the presence of God/Higher Power),Life review/legacy,Prayer (actual),Prayer (assurance of)     Family/Friend(s):  Affirmation of emotions/emotional suffering,Initial Assessment,Prayer (actual),Prayer (assurance of)     Plan of Care:     [] Support spiritual and/or cultural needs    [] Support AMD and/or advance care planning process      [] Support grieving process   [] Coordinate Rites and/or Rituals    [] Coordination with community clergy   [] No spiritual needs identified at this time   [] Detailed Plan of Care below (See Comments)  [] Make referral to Music Therapy  [] Make referral to Pet Therapy     [] Make referral to Addiction services  [] Make referral to [facility]  [] Make referral to Spiritual Care Partner  [] No future visits requested        [x] Contact Spiritual Care for further referrals     Comments: Provided support for this pt in [facility]. Reviewed pt's chart prior to this visit. Pt's  was present for this visit. Facilitated life review to assess potential support needs or coping strategies. Pt offered review of current situation. Provided pastoral support as pt processed this hospitalization at points in tears. Pt inquired about Mormonism Communion. Advised of [employer] [facility] availability in the morning. Offered prayer for pt. Assured pt of continued prayers. Cathie Strauss MDiv.  Staff   Request  Support/Spiritual Care Services on [phone])

## 2022-07-15 NOTE — PROGRESS NOTES
[facility]  Hospitalist Group                                                                                          Hospitalist Progress Note  Greta Thibodeaux MD  Answering service: [phone] from in house phone        Date of Service:  7/15/2022  NAME:  Clayton Bellamy  :  1955  MRN:  [id number]      Admission Summary:   HPI: Nickie Trevizo is a 74 y.o. female who presents with abdominal pain     Patient had colonoscopy done yesterday, started experiencing left-sided abdominal pain with left shoulder pain, symptoms started around 3 PM, immediately after waking up from colonoscopy, patient continued to have nausea associated with abdominal pain, she took some Tylenol at home, symptoms did not jasper, called her GI doctor and came to the ER, patient came [facility], had imaging done and was found to have splenic laceration and was requested to be admitted to the hospitalist service, denies any other complaints or problems\"       Interval history / Subjective:   Seen examined at bedside.  Abdominal pain much improved, had headache earlier this am will receive Imitrex     Assessment & Plan:     Splenic Lac  Acute blood loss anemia  Acute PE LLL  Hypocalcemia   -appreciate surgery and IR, no acute  intervention at this point cont supportive care   -CTA chest confirmed LLL PE   -Discussed risk versus benefits with patient at this point in time increased risk for bleed from splenic lac outweights benefit of ac for PE   -place IVC filter 7/15  -Continue to trend H&H's, transfuse for hemoglobin less than 7  -ca improved s/p full mentation continue to monitor  -follow cultures > bld cx ngtd   -advance diet per surgery        Code status: full   Prophylaxis: [address] discussed with: patient/family, nurse, consulted  Anticipated Disposition: > 48 hrs      Hospital Problems  Never Reviewed          Codes Class Noted POA    Splenic laceration ICD-10-CM: Y53.162C  ICD-9-CM: 865.09  7/12/2022 Unknown                Review of Systems:   A comprehensive review of systems was negative except for that written in the HPI. Vital Signs:    Last 24hrs VS reviewed since prior progress note. Most recent are:  Visit Vitals  /89 (BP 1 Location: Right upper arm, BP Patient Position: At rest)   Pulse 100   Temp 99.6 °F (37.6 °C)   Resp 24   Ht 5' 3\" (1.6 m)   Wt 79 kg (174 lb 2.6 oz)   SpO2 98%   BMI 30.85 kg/m²         Intake/Output Summary (Last 24 hours) at 7/15/2022 1524  Last data filed at 7/15/2022 1447  Gross per 24 hour   Intake --   Output 4950 ml   Net -4950 ml        Physical Examination:     I had a face to face encounter with this patient and independently examined them on 7/15/2022 as outlined below:          Constitutional:  No acute distress, cooperative, pleasant    ENT:  Oral mucosa moist, oropharynx benign. Resp:  CTA bilaterally. No wheezing/rhonchi/rales. No accessory muscle use. CV:  Regular rhythm, normal rate, no murmurs, gallops, rubs    GI:  Soft, non distended, non tender. normoactive bowel sounds, no hepatosplenomegaly     Musculoskeletal:  No edema, warm, 2+ pulses throughout    Neurologic:  Moves all extremities. AAOx3, CN II-XII reviewed            Data Review:    Review and/or order of clinical lab test  Review and/or order of tests in the radiology section of CPT  Review and/or order of tests in the medicine section of CPT      Labs:     Recent Labs     07/15/22  0525 07/14/22  2255 07/14/22  1620 07/14/22  0423   WBC 8.1  --   --  8.5   HGB 8.2* 7.7*   < > 7.7*   HCT 24.3* 22.4*   < > 23.1*     --   --  222    < > = values in this interval not displayed.      Recent Labs     07/15/22  0525 07/14/22  0423 07/12/22 2004    138 138   K 3.4* 3.7 3.8    108 106   CO2 27 24 24   BUN 2* 11 10   CREA 0.47* 0.64 0.99   * 100 187*   CA 8.4* 8.0* 7.7*   MG  --   --  1.9     Recent Labs     07/14/22  042 07/12/22 2004   ALT 14 17   AP 35* 50   TBILI 0.6 0.6   TP 5.3* 5.7*   ALB 2.6* 3.1*   GLOB 2.7 2.6     No results for input(s): INR, PTP, APTT, INREXT, INREXT in the last 72 hours. No results for input(s): FE, TIBC, PSAT, FERR in the last 72 hours. No results found for: FOL, RBCF   No results for input(s): PH, PCO2, PO2 in the last 72 hours. No results for input(s): CPK, CKNDX, TROIQ in the last 72 hours.     No lab exists for component: CPKMB  No results found for: CHOL, CHOLX, CHLST, CHOLV, HDL, HDLP, LDL, LDLC, DLDLP, TGLX, TRIGL, TRIGP, CHHD, CHHDX  No results found for: GLUCPOC  No results found for: COLOR, APPRN, SPGRU, REFSG, FELY, PROTU, GLUCU, KETU, BILU, UROU, KAITLIN, LEUKU, GLUKE, EPSU, BACTU, WBCU, RBCU, CASTS, UCRY      Medications Reviewed:     Current Facility-Administered Medications   Medication Dose Route Frequency    butalbital-acetaminophen-caffeine (FIORICET, ESGIC) -40 mg per tablet 1 Tablet  1 Tablet Oral Q4H PRN    nicotine (NICODERM CQ) 21 mg/24 hr patch 1 Patch  1 Patch TransDERmal DAILY    HYDROmorphone (DILAUDID) injection 1 mg  1 mg IntraVENous Q3H PRN    ondansetron (ZOFRAN) injection 4 mg  4 mg IntraVENous Q4H PRN    sodium chloride (NS) flush 5-40 mL  5-40 mL IntraVENous Q8H    sodium chloride (NS) flush 5-40 mL  5-40 mL IntraVENous PRN    acetaminophen (TYLENOL) tablet 650 mg  650 mg Oral Q4H PRN    lactated Ringers infusion  150 mL/hr IntraVENous CONTINUOUS     Facility-Administered Medications Ordered in Other Encounters   Medication Dose Route Frequency    lidocaine (XYLOCAINE) 20 mg/mL (2 %) injection 400 mg  20 mL SubCUTAneous RAD ONCE    iopamidoL (ISOVUE-370) 76 % injection 50 mL  50 mL IntraVENous RAD ONCE     ______________________________________________________________________  EXPECTED LENGTH OF STAY: 4d 7h  ACTUAL LENGTH OF STAY:          2                 Nestor Hanks MD

## 2022-07-16 ENCOUNTER — HOSPITAL ENCOUNTER (OUTPATIENT)
Dept: INTERVENTIONAL RADIOLOGY/VASCULAR | Age: 67
Discharge: HOME | DRG: 907 | End: 2022-07-16
Attending: FAMILY MEDICINE
Payer: COMMERCIAL

## 2022-07-16 ENCOUNTER — APPOINTMENT (OUTPATIENT)
Dept: GENERAL RADIOLOGY | Age: 67
DRG: 907 | End: 2022-07-16
Attending: FAMILY MEDICINE
Payer: COMMERCIAL

## 2022-07-16 ENCOUNTER — APPOINTMENT (OUTPATIENT)
Dept: CT IMAGING | Age: 67
DRG: 907 | End: 2022-07-16
Attending: FAMILY MEDICINE
Payer: COMMERCIAL

## 2022-07-16 VITALS
HEART RATE: 107 BPM | RESPIRATION RATE: 28 BRPM | OXYGEN SATURATION: 100 % | SYSTOLIC BLOOD PRESSURE: 104 MMHG | DIASTOLIC BLOOD PRESSURE: 85 MMHG

## 2022-07-16 LAB
ANION GAP SERPL CALC-SCNC: 5 MMOL/L (ref 5–15)
ANION GAP SERPL CALC-SCNC: 6 MMOL/L (ref 5–15)
BASOPHILS # BLD: 0.1 K/UL (ref 0–0.1)
BASOPHILS NFR BLD: 1 % (ref 0–1)
BUN SERPL-MCNC: 4 MG/DL (ref 6–20)
BUN SERPL-MCNC: 9 MG/DL (ref 6–20)
BUN/CREAT SERPL: 12 (ref 12–20)
BUN/CREAT SERPL: 8 (ref 12–20)
CALCIUM SERPL-MCNC: 8.6 MG/DL (ref 8.5–10.1)
CALCIUM SERPL-MCNC: 8.7 MG/DL (ref 8.5–10.1)
CHLORIDE SERPL-SCNC: 107 MMOL/L (ref 97–108)
CHLORIDE SERPL-SCNC: 108 MMOL/L (ref 97–108)
CO2 SERPL-SCNC: 24 MMOL/L (ref 21–32)
CO2 SERPL-SCNC: 25 MMOL/L (ref 21–32)
CREAT SERPL-MCNC: 0.51 MG/DL (ref 0.55–1.02)
CREAT SERPL-MCNC: 0.78 MG/DL (ref 0.55–1.02)
DIFFERENTIAL METHOD BLD: ABNORMAL
EOSINOPHIL # BLD: 0.2 K/UL (ref 0–0.4)
EOSINOPHIL NFR BLD: 3 % (ref 0–7)
ERYTHROCYTE [DISTWIDTH] IN BLOOD BY AUTOMATED COUNT: 13.3 % (ref 11.5–14.5)
ERYTHROCYTE [DISTWIDTH] IN BLOOD BY AUTOMATED COUNT: 13.6 % (ref 11.5–14.5)
GLUCOSE SERPL-MCNC: 100 MG/DL (ref 65–100)
GLUCOSE SERPL-MCNC: 155 MG/DL (ref 65–100)
HCT VFR BLD AUTO: 22 % (ref 35–47)
HCT VFR BLD AUTO: 22.7 % (ref 35–47)
HGB BLD-MCNC: 7.4 G/DL (ref 11.5–16)
HGB BLD-MCNC: 7.6 G/DL (ref 11.5–16)
HGB BLD-MCNC: 8.1 G/DL (ref 11.5–16)
HISTORY CHECKED?,CKHIST: NORMAL
IMM GRANULOCYTES # BLD AUTO: 0 K/UL (ref 0–0.04)
IMM GRANULOCYTES NFR BLD AUTO: 0 % (ref 0–0.5)
LACTATE SERPL-SCNC: 1.7 MMOL/L (ref 0.4–2)
LYMPHOCYTES # BLD: 1.9 K/UL (ref 0.8–3.5)
LYMPHOCYTES NFR BLD: 27 % (ref 12–49)
MAGNESIUM SERPL-MCNC: 1.9 MG/DL (ref 1.6–2.4)
MCH RBC QN AUTO: 33.2 PG (ref 26–34)
MCH RBC QN AUTO: 33.3 PG (ref 26–34)
MCHC RBC AUTO-ENTMCNC: 33.5 G/DL (ref 30–36.5)
MCHC RBC AUTO-ENTMCNC: 33.6 G/DL (ref 30–36.5)
MCV RBC AUTO: 99.1 FL (ref 80–99)
MCV RBC AUTO: 99.1 FL (ref 80–99)
MONOCYTES # BLD: 0.6 K/UL (ref 0–1)
MONOCYTES NFR BLD: 8 % (ref 5–13)
NEUTS SEG # BLD: 4.4 K/UL (ref 1.8–8)
NEUTS SEG NFR BLD: 61 % (ref 32–75)
NRBC # BLD: 0 K/UL (ref 0–0.01)
NRBC # BLD: 0 K/UL (ref 0–0.01)
NRBC BLD-RTO: 0 PER 100 WBC
NRBC BLD-RTO: 0 PER 100 WBC
PLATELET # BLD AUTO: 254 K/UL (ref 150–400)
PLATELET # BLD AUTO: 275 K/UL (ref 150–400)
PMV BLD AUTO: 9.5 FL (ref 8.9–12.9)
PMV BLD AUTO: 9.7 FL (ref 8.9–12.9)
POTASSIUM SERPL-SCNC: 3.6 MMOL/L (ref 3.5–5.1)
POTASSIUM SERPL-SCNC: 4 MMOL/L (ref 3.5–5.1)
RBC # BLD AUTO: 2.22 M/UL (ref 3.8–5.2)
RBC # BLD AUTO: 2.29 M/UL (ref 3.8–5.2)
SODIUM SERPL-SCNC: 137 MMOL/L (ref 136–145)
SODIUM SERPL-SCNC: 138 MMOL/L (ref 136–145)
TROPONIN-HIGH SENSITIVITY: 7 NG/L (ref 0–51)
WBC # BLD AUTO: 12.1 K/UL (ref 3.6–11)
WBC # BLD AUTO: 7.1 K/UL (ref 3.6–11)

## 2022-07-16 PROCEDURE — C1894 INTRO/SHEATH, NON-LASER: HCPCS

## 2022-07-16 PROCEDURE — 86923 COMPATIBILITY TEST ELECTRIC: CPT

## 2022-07-16 PROCEDURE — 36415 COLL VENOUS BLD VENIPUNCTURE: CPT

## 2022-07-16 PROCEDURE — 74174 CTA ABD&PLVS W/CONTRAST: CPT

## 2022-07-16 PROCEDURE — 85025 COMPLETE CBC W/AUTO DIFF WBC: CPT

## 2022-07-16 PROCEDURE — 85027 COMPLETE CBC AUTOMATED: CPT

## 2022-07-16 PROCEDURE — C1769 GUIDE WIRE: HCPCS

## 2022-07-16 PROCEDURE — C1887 CATHETER, GUIDING: HCPCS

## 2022-07-16 PROCEDURE — 04L43DZ OCCLUSION OF SPLENIC ARTERY WITH INTRALUMINAL DEVICE, PERCUTANEOUS APPROACH: ICD-10-PCS | Performed by: STUDENT IN AN ORGANIZED HEALTH CARE EDUCATION/TRAINING PROGRAM

## 2022-07-16 PROCEDURE — 80048 BASIC METABOLIC PNL TOTAL CA: CPT

## 2022-07-16 PROCEDURE — 36430 TRANSFUSION BLD/BLD COMPNT: CPT

## 2022-07-16 PROCEDURE — 74011000250 HC RX REV CODE- 250: Performed by: FAMILY MEDICINE

## 2022-07-16 PROCEDURE — 99232 SBSQ HOSP IP/OBS MODERATE 35: CPT | Performed by: SURGERY

## 2022-07-16 PROCEDURE — 74011250636 HC RX REV CODE- 250/636: Performed by: SURGERY

## 2022-07-16 PROCEDURE — 93005 ELECTROCARDIOGRAM TRACING: CPT

## 2022-07-16 PROCEDURE — 74011000636 HC RX REV CODE- 636: Performed by: RADIOLOGY

## 2022-07-16 PROCEDURE — 83605 ASSAY OF LACTIC ACID: CPT

## 2022-07-16 PROCEDURE — 65660000001 HC RM ICU INTERMED STEPDOWN

## 2022-07-16 PROCEDURE — 74011250637 HC RX REV CODE- 250/637: Performed by: STUDENT IN AN ORGANIZED HEALTH CARE EDUCATION/TRAINING PROGRAM

## 2022-07-16 PROCEDURE — 74018 RADEX ABDOMEN 1 VIEW: CPT

## 2022-07-16 PROCEDURE — P9016 RBC LEUKOCYTES REDUCED: HCPCS

## 2022-07-16 PROCEDURE — 86900 BLOOD TYPING SEROLOGIC ABO: CPT

## 2022-07-16 PROCEDURE — 2709999900 HC NON-CHARGEABLE SUPPLY

## 2022-07-16 PROCEDURE — 84484 ASSAY OF TROPONIN QUANT: CPT

## 2022-07-16 PROCEDURE — 74011000250 HC RX REV CODE- 250: Performed by: STUDENT IN AN ORGANIZED HEALTH CARE EDUCATION/TRAINING PROGRAM

## 2022-07-16 PROCEDURE — 30233N1 TRANSFUSION OF NONAUTOLOGOUS RED BLOOD CELLS INTO PERIPHERAL VEIN, PERCUTANEOUS APPROACH: ICD-10-PCS | Performed by: SURGERY

## 2022-07-16 PROCEDURE — 37244 VASC EMBOLIZE/OCCLUDE BLEED: CPT

## 2022-07-16 PROCEDURE — 85018 HEMOGLOBIN: CPT

## 2022-07-16 PROCEDURE — 74011250636 HC RX REV CODE- 250/636: Performed by: FAMILY MEDICINE

## 2022-07-16 PROCEDURE — 83735 ASSAY OF MAGNESIUM: CPT

## 2022-07-16 RX ORDER — LIDOCAINE HYDROCHLORIDE 20 MG/ML
20 INJECTION, SOLUTION INFILTRATION; PERINEURAL
Status: COMPLETED | OUTPATIENT
Start: 2022-07-16 | End: 2022-07-17

## 2022-07-16 RX ORDER — FENTANYL CITRATE 50 UG/ML
12.5-2 INJECTION, SOLUTION INTRAMUSCULAR; INTRAVENOUS
Status: DISCONTINUED | OUTPATIENT
Start: 2022-07-16 | End: 2022-07-17

## 2022-07-16 RX ORDER — MAG HYDROX/ALUMINUM HYD/SIMETH 200-200-20
30 SUSPENSION, ORAL (FINAL DOSE FORM) ORAL
Qty: 150 ML | Refills: 0 | Status: SHIPPED | OUTPATIENT
Start: 2022-07-16 | End: 2022-07-21

## 2022-07-16 RX ORDER — SODIUM CHLORIDE 9 MG/ML
250 INJECTION, SOLUTION INTRAVENOUS AS NEEDED
Status: DISCONTINUED | OUTPATIENT
Start: 2022-07-16 | End: 2022-07-19 | Stop reason: SDUPTHER

## 2022-07-16 RX ORDER — FLUMAZENIL 0.1 MG/ML
0.5 INJECTION INTRAVENOUS
Status: DISCONTINUED | OUTPATIENT
Start: 2022-07-16 | End: 2022-07-17

## 2022-07-16 RX ORDER — SODIUM CHLORIDE 9 MG/ML
25 INJECTION, SOLUTION INTRAVENOUS CONTINUOUS
Status: DISCONTINUED | OUTPATIENT
Start: 2022-07-17 | End: 2022-07-17

## 2022-07-16 RX ORDER — NALOXONE HYDROCHLORIDE 1 MG/ML
0.4 INJECTION INTRAMUSCULAR; INTRAVENOUS; SUBCUTANEOUS
Status: DISCONTINUED | OUTPATIENT
Start: 2022-07-16 | End: 2022-07-17

## 2022-07-16 RX ORDER — POLYETHYLENE GLYCOL 3350 17 G/17G
17 POWDER, FOR SOLUTION ORAL DAILY
Status: DISCONTINUED | OUTPATIENT
Start: 2022-07-16 | End: 2022-07-17

## 2022-07-16 RX ORDER — SENNOSIDES 8.6 MG/1
1 TABLET ORAL DAILY
Status: DISCONTINUED | OUTPATIENT
Start: 2022-07-17 | End: 2022-07-17

## 2022-07-16 RX ORDER — MIDAZOLAM HYDROCHLORIDE 1 MG/ML
.5-5 INJECTION, SOLUTION INTRAMUSCULAR; INTRAVENOUS
Status: DISCONTINUED | OUTPATIENT
Start: 2022-07-16 | End: 2022-07-17

## 2022-07-16 RX ORDER — MAG HYDROX/ALUMINUM HYD/SIMETH 200-200-20
30 SUSPENSION, ORAL (FINAL DOSE FORM) ORAL
Status: DISCONTINUED | OUTPATIENT
Start: 2022-07-16 | End: 2022-07-22 | Stop reason: HOSPADM

## 2022-07-16 RX ADMIN — POLYETHYLENE GLYCOL 3350 17 G: 17 POWDER, FOR SOLUTION ORAL at 17:45

## 2022-07-16 RX ADMIN — IOPAMIDOL 100 ML: 755 INJECTION, SOLUTION INTRAVENOUS at 21:09

## 2022-07-16 RX ADMIN — SODIUM CHLORIDE, PRESERVATIVE FREE 10 ML: 5 INJECTION INTRAVENOUS at 14:00

## 2022-07-16 RX ADMIN — ONDANSETRON 4 MG: 2 INJECTION INTRAMUSCULAR; INTRAVENOUS at 15:15

## 2022-07-16 RX ADMIN — SODIUM CHLORIDE, PRESERVATIVE FREE 10 ML: 5 INJECTION INTRAVENOUS at 06:25

## 2022-07-16 RX ADMIN — SODIUM CHLORIDE, POTASSIUM CHLORIDE, SODIUM LACTATE AND CALCIUM CHLORIDE 1000 ML: 600; 310; 30; 20 INJECTION, SOLUTION INTRAVENOUS at 22:58

## 2022-07-16 RX ADMIN — BUTALBITAL, ACETAMINOPHEN, AND CAFFEINE 1 TABLET: 50; 325; 40 TABLET ORAL at 00:42

## 2022-07-16 RX ADMIN — LIDOCAINE HYDROCHLORIDE 40 ML: 20 SOLUTION ORAL; TOPICAL at 17:07

## 2022-07-16 RX ADMIN — HYDROMORPHONE HYDROCHLORIDE 1 MG: 1 INJECTION, SOLUTION INTRAMUSCULAR; INTRAVENOUS; SUBCUTANEOUS at 22:11

## 2022-07-16 NOTE — PROGRESS NOTES
Progress Note    Patient: Sonam Ceron MRN: [id number]  SSN: [id number]    YOB: 1955  Age: 74 y.o. Sex: female      Admit Date: 2022    * No surgery found *    Procedure:      Subjective:     Patient still with some pain today but no worse. Has been tolerating full liquids. States she is unable to get any rest while at the hospital.    Objective:     Visit Vitals  /82 (BP 1 Location: Right upper arm, BP Patient Position: Sitting)   Pulse 94   Temp 98.3 °F (36.8 °C)   Resp 20   Ht 5' 3\" (1.6 m)   Wt 79 kg (174 lb 2.6 oz)   SpO2 98%   BMI 30.85 kg/m²       Temp (24hrs), Av °F (37.2 °C), Min:98.3 °F (36.8 °C), Max:99.6 °F (37.6 °C)      Physical Exam:    General alert no acute distress  Lungs clear bilaterally  Heart regular rate and rhythm  Abdomen soft nontender nondistended  No bruising    Data Review: images and reports reviewed    Lab Review: All lab results for the last 24 hours reviewed. Recent Results (from the past 24 hour(s))   CBC WITH AUTOMATED DIFF    Collection Time: 22  5:49 AM   Result Value Ref Range    WBC 7.1 3.6 - 11.0 K/uL    RBC 2.29 (L) 3.80 - 5.20 M/uL    HGB 7.6 (L) 11.5 - 16.0 g/dL    HCT 22.7 (L) 35.0 - 47.0 %    MCV 99.1 (H) 80.0 - 99.0 FL    MCH 33.2 26.0 - 34.0 PG    MCHC 33.5 30.0 - 36.5 g/dL    RDW 13.3 11.5 - 14.5 %    PLATELET 188 562 - 135 K/uL    MPV 9.5 8.9 - 12.9 FL    NRBC 0.0 0  WBC    ABSOLUTE NRBC 0.00 0.00 - 0.01 K/uL    NEUTROPHILS 61 32 - 75 %    LYMPHOCYTES 27 12 - 49 %    MONOCYTES 8 5 - 13 %    EOSINOPHILS 3 0 - 7 %    BASOPHILS 1 0 - 1 %    IMMATURE GRANULOCYTES 0 0.0 - 0.5 %    ABS. NEUTROPHILS 4.4 1.8 - 8.0 K/UL    ABS. LYMPHOCYTES 1.9 0.8 - 3.5 K/UL    ABS. MONOCYTES 0.6 0.0 - 1.0 K/UL    ABS. EOSINOPHILS 0.2 0.0 - 0.4 K/UL    ABS. BASOPHILS 0.1 0.0 - 0.1 K/UL    ABS. IMM.  GRANS. 0.0 0.00 - 0.04 K/UL    DF AUTOMATED     METABOLIC PANEL, BASIC    Collection Time: 22  5:49 AM   Result Value Ref Range    Sodium 138 136 - 145 mmol/L    Potassium 3.6 3.5 - 5.1 mmol/L    Chloride 108 97 - 108 mmol/L    CO2 25 21 - 32 mmol/L    Anion gap 5 5 - 15 mmol/L    Glucose 100 65 - 100 mg/dL    BUN 4 (L) 6 - 20 MG/DL    Creatinine 0.51 (L) 0.55 - 1.02 MG/DL    BUN/Creatinine ratio 8 (L) 12 - 20      GFR est AA >60 >60 ml/min/1.73m2    GFR est non-AA >60 >60 ml/min/1.73m2    Calcium 8.7 8.5 - 10.1 MG/DL       Assessment:     Hospital Problems  Never Reviewed          Codes Class Noted POA    Splenic laceration ICD-10-CM: O90.925D  ICD-9-CM: 865.09  7/12/2022 Unknown              Plan/Recommendations/Medical Decision Making:   Splenic laceration  Overall appears to be stable. H&H a little bit lower today though consistent with what it was 2 days ago. We will advance diet to regular  No plans for operative intervention at this time. Once again should there be a precipitous drop next step would be IR.   May be discharged home when considered medically stable

## 2022-07-16 NOTE — PROGRESS NOTES
Patient with abdominal pain and upset stomach. She is not passing gas but she is belching a little. I updated Dr. Ornelas Patience and she prescribed GI cocktail. I gave the patient GI cocktail and she is walking up and down hallway with . Patient states she does \"not feel better and does not want to leave at this time\"  MD. Updated.

## 2022-07-16 NOTE — PROGRESS NOTES
[facility] Adult  Hospitalist Group                                                                                          Hospitalist Progress Note  Harika Combs MD  Answering service: [phone] OR [phone] from in house phone        Date of Service:  2022  NAME:  Binta Parmar  :  1955  MRN:  [id number]      Admission Summary:   HPI: Michi Hinkle is a 74 y.o. female who presents with abdominal pain     Patient had colonoscopy done yesterday, started experiencing left-sided abdominal pain with left shoulder pain, symptoms started around 3 PM, immediately after waking up from colonoscopy, patient continued to have nausea associated with abdominal pain, she took some Tylenol at home, symptoms did not jasper, called her GI doctor and came to the ER, [facility], had imaging done and was found to have splenic laceration and was requested to be admitted to the hospitalist service, denies any other complaints or problems\"       Interval history / Subjective:   Seen examined at bedside. Abdominal pain better.  Hgb slight downtrend, didn't sleep well but otherwise fine      Assessment & Plan:     Splenic Lac  Acute blood loss anemia  Acute PE LLL  Hypocalcemia   -appreciate surgery and IR, no acute  intervention at this point cont supportive care   -CTA chest confirmed LLL PE   -Discussed risk versus benefits with patient at this point in time increased risk for bleed from splenic lac outweights benefit of ac for PE   -placed IVC filter 7/15  -Continue to trend H&H's, transfuse for hemoglobin less than 7  -ca improved s/p full mentation continue to monitor  -follow cultures > bld cx ngtd   -advance diet per surgery        Code status: full   Prophylaxis: [address] discussed with: patient/family, nurse, consulted  Anticipated Disposition: 24-48 hrs      Hospital Problems  Never Reviewed          Codes Class Noted POA    Splenic laceration ICD-10-CM: U17.907E  ICD-9-CM: 865.09  7/12/2022 Unknown                Review of Systems:   A comprehensive review of systems was negative except for that written in the HPI. Vital Signs:    Last 24hrs VS reviewed since prior progress note. Most recent are:  Visit Vitals  /82 (BP 1 Location: Right upper arm, BP Patient Position: Sitting)   Pulse 94   Temp 98.3 °F (36.8 °C)   Resp 20   Ht 5' 3\" (1.6 m)   Wt 79 kg (174 lb 2.6 oz)   SpO2 98%   BMI 30.85 kg/m²         Intake/Output Summary (Last 24 hours) at 7/16/2022 1313  Last data filed at 7/16/2022 0555  Gross per 24 hour   Intake 1000 ml   Output 600 ml   Net 400 ml        Physical Examination:     I had a face to face encounter with this patient and independently examined them on 7/16/2022 as outlined below:          Constitutional:  No acute distress, cooperative, pleasant    ENT:  Oral mucosa moist, oropharynx benign. Resp:  CTA bilaterally. No wheezing/rhonchi/rales. No accessory muscle use. CV:  Regular rhythm, normal rate, no murmurs, gallops, rubs    GI:  Soft, non distended, non tender. normoactive bowel sounds, no hepatosplenomegaly     Musculoskeletal:  No edema, warm, 2+ pulses throughout    Neurologic:  Moves all extremities. AAOx3, CN II-XII reviewed            Data Review:    Review and/or order of clinical lab test  Review and/or order of tests in the radiology section of CPT  Review and/or order of tests in the medicine section of CPT      Labs:     Recent Labs     07/16/22  0549 07/15/22  0525   WBC 7.1 8.1   HGB 7.6* 8.2*   HCT 22.7* 24.3*    242     Recent Labs     07/16/22  0549 07/15/22  0525 07/14/22  0423    140 138   K 3.6 3.4* 3.7    107 108   CO2 25 27 24   BUN 4* 2* 11   CREA 0.51* 0.47* 0.64    107* 100   CA 8.7 8.4* 8.0*     Recent Labs     07/14/22  0423   ALT 14   AP 35*   TBILI 0.6   TP 5.3*   ALB 2.6*   GLOB 2.7     No results for input(s): INR, PTP, APTT, INREXT, INREXT in the last 72 hours.    No results for input(s): FE, TIBC, PSAT, FERR in the last 72 hours. No results found for: FOL, RBCF   No results for input(s): PH, PCO2, PO2 in the last 72 hours. No results for input(s): CPK, CKNDX, TROIQ in the last 72 hours.     No lab exists for component: CPKMB  No results found for: CHOL, CHOLX, CHLST, CHOLV, HDL, HDLP, LDL, LDLC, DLDLP, TGLX, TRIGL, TRIGP, CHHD, CHHDX  No results found for: GLUCPOC  No results found for: COLOR, APPRN, SPGRU, REFSG, FELY, PROTU, GLUCU, KETU, BILU, UROU, KAITLIN, LEUKU, GLUKE, EPSU, BACTU, WBCU, RBCU, CASTS, UCRY      Medications Reviewed:     Current Facility-Administered Medications   Medication Dose Route Frequency    melatonin tablet 3 mg  3 mg Oral QHS    butalbital-acetaminophen-caffeine (FIORICET, ESGIC) -40 mg per tablet 1 Tablet  1 Tablet Oral Q4H PRN    nicotine (NICODERM CQ) 21 mg/24 hr patch 1 Patch  1 Patch TransDERmal DAILY    HYDROmorphone (DILAUDID) injection 1 mg  1 mg IntraVENous Q3H PRN    ondansetron (ZOFRAN) injection 4 mg  4 mg IntraVENous Q4H PRN    sodium chloride (NS) flush 5-40 mL  5-40 mL IntraVENous Q8H    sodium chloride (NS) flush 5-40 mL  5-40 mL IntraVENous PRN    acetaminophen (TYLENOL) tablet 650 mg  650 mg Oral Q4H PRN     ______________________________________________________________________  EXPECTED LENGTH OF STAY: 4d 7h  ACTUAL LENGTH OF STAY:          3                 Cora Wise MD

## 2022-07-16 NOTE — DISCHARGE SUMMARY
Discharge Summary       PATIENT ID: Shanell Razo  MRN: [id number]   YOB: 1955    DATE OF ADMISSION: 7/12/2022  7:47 PM    DATE OF DISCHARGE: 07/16/22   PRIMARY CARE PROVIDER: Madeline Lacey MD     ATTENDING PHYSICIAN: Freddy Green MD  DISCHARGING PROVIDER: Freddy Green MD    To contact this individual call [phone] and ask the  to page. If unavailable ask to be transferred the Adult Hospitalist Department. CONSULTATIONS: IP CONSULT TO INTERVENTIONAL RADIOLOGY  IP CONSULT TO INTERVENTIONAL RADIOLOGY  IP CONSULT TO GENERAL SURGERY    PROCEDURES/SURGERIES: * No surgery found *    ADMITTING DIAGNOSES & HOSPITAL COURSE:   HPI: \"Silvana aDo is a 74 y. o. female who presents with abdominal pain     Patient had colonoscopy done yesterday, started experiencing left-sided abdominal pain with left shoulder pain, symptoms started around 3 PM, immediately after waking up from colonoscopy, patient continued to have nausea associated with abdominal pain, she took some Tylenol at home, symptoms did not jasper, called her GI doctor and came to the ER, patient came [facility], had imaging done and was found to have splenic laceration and was requested to be admitted to the hospitalist service, denies any other complaints or problems\"    Addedum: patient does not feel well this afternoon, gi upset, given gi coctail, prn mylanta will hold dc anticipate dc tomorrow am             DISCHARGE DIAGNOSES / PLAN:      Splenic Lac  Acute blood loss anemia  Acute PE LLL  Hypocalcemia   -appreciate surgery and IR, no acute  intervention at this point cont supportive care   -CTA chest confirmed LLL PE   -CT abdomen rpted 7/14 previously demonstrated \"previously active bleeding from splenic laceration resolved\"   -Discussed risk versus benefits with patient at this point in time increased risk for bleed from splenic lac outweights benefit of ac for PE she will need rpt cta chest in 4 weeks for reassessment of PE, can consider re-initating ac op   -placed IVC filter 7/15  -Continue to trend H&H's, improved and stable at 8's   -ca improved s/p full mentation continue to monitor  -follow cultures > bld cx ngtd   -tolerating diet    -f/u op with general surgery         Code status: full   Prophylaxis: [address] discussed with: patient/family, nurse, consulted  Anticipated Disposition: 24 hrs          FOLLOW UP APPOINTMENTS:    Follow-up Information     Follow up With Specialties Details Why Contact Info    Arzella Carrel, MD Family Medicine  rpt CTA chest 4 weeks for eval of PE  [address]  [place] [address]  [phone]      Lucia Garcia MD General Surgery, Bariatrics In 1 week splenic laceration f/u  [address] [place] [address] [address] [address]  [phone]             ADDITIONAL CARE RECOMMENDATIONS: Repeat CBC, BMP 3 to 5 days    DIET: Resume previous diet    ACTIVITY: Activity as tolerated      DISCHARGE MEDICATIONS:  Current Discharge Medication List      CONTINUE these medications which have NOT CHANGED    Details   amLODIPine (NORVASC) 5 mg tablet Take 5 mg by mouth daily. doxycycline (PERIOSTAT) 20 mg tablet       hydroCHLOROthiazide (HYDRODIURIL) 25 mg tablet Take 25 mg by mouth daily. olmesartan (BENICAR) 20 mg tablet TAKE 1 TABLET BY MOUTH EVERY DAY FOR 90 DAYS               NOTIFY YOUR PHYSICIAN FOR ANY OF THE FOLLOWING:   Fever over 101 degrees for 24 hours. Chest pain, shortness of breath, fever, chills, nausea, vomiting, diarrhea, change in mentation, falling, weakness, bleeding. Severe pain or pain not relieved by medications. Or, any other signs or symptoms that you may have questions about.     DISPOSITION:    Home With:   OT  PT  HH  RN       Long term SNF/Inpatient Rehab   x Independent/assisted living    Hospice    Other:       PATIENT CONDITION AT DISCHARGE:     Functional status    Poor     Deconditioned    x Independent      Cognition   x  Lucid Forgetful     Dementia      Catheters/lines (plus indication)    Marie     PICC     PEG    x None      Code status    x Full code     DNR      PHYSICAL EXAMINATION AT DISCHARGE:   Refer to Progress Note      CHRONIC MEDICAL DIAGNOSES:  Problem List as of 7/16/2022 Never Reviewed          Codes Class Noted - Resolved    Splenic laceration ICD-10-CM: G35.057G  ICD-9-CM: 865.09  7/12/2022 - Present              Greater than 30 minutes were spent with the patient on counseling and coordination of care    Signed:   Cora Wise MD  7/16/2022  3:16 PM

## 2022-07-17 LAB
ANION GAP SERPL CALC-SCNC: 7 MMOL/L (ref 5–15)
ATRIAL RATE: 90 BPM
BASOPHILS # BLD: 0 K/UL (ref 0–0.1)
BASOPHILS NFR BLD: 0 % (ref 0–1)
BUN SERPL-MCNC: 16 MG/DL (ref 6–20)
BUN/CREAT SERPL: 16 (ref 12–20)
CALCIUM SERPL-MCNC: 7.9 MG/DL (ref 8.5–10.1)
CALCULATED P AXIS, ECG09: 65 DEGREES
CALCULATED R AXIS, ECG10: -17 DEGREES
CALCULATED T AXIS, ECG11: 63 DEGREES
CHLORIDE SERPL-SCNC: 108 MMOL/L (ref 97–108)
CO2 SERPL-SCNC: 22 MMOL/L (ref 21–32)
CREAT SERPL-MCNC: 1 MG/DL (ref 0.55–1.02)
DIAGNOSIS, 93000: NORMAL
DIFFERENTIAL METHOD BLD: ABNORMAL
EOSINOPHIL # BLD: 0 K/UL (ref 0–0.4)
EOSINOPHIL NFR BLD: 0 % (ref 0–7)
ERYTHROCYTE [DISTWIDTH] IN BLOOD BY AUTOMATED COUNT: 13.8 % (ref 11.5–14.5)
GLUCOSE SERPL-MCNC: 140 MG/DL (ref 65–100)
HCT VFR BLD AUTO: 20.8 % (ref 35–47)
HGB BLD-MCNC: 7.1 G/DL (ref 11.5–16)
HGB BLD-MCNC: 8.5 G/DL (ref 11.5–16)
HISTORY CHECKED?,CKHIST: NORMAL
IMM GRANULOCYTES # BLD AUTO: 0.1 K/UL (ref 0–0.04)
IMM GRANULOCYTES NFR BLD AUTO: 0 % (ref 0–0.5)
LYMPHOCYTES # BLD: 0.9 K/UL (ref 0.8–3.5)
LYMPHOCYTES NFR BLD: 8 % (ref 12–49)
MCH RBC QN AUTO: 34 PG (ref 26–34)
MCHC RBC AUTO-ENTMCNC: 34.1 G/DL (ref 30–36.5)
MCV RBC AUTO: 99.5 FL (ref 80–99)
MONOCYTES # BLD: 0.9 K/UL (ref 0–1)
MONOCYTES NFR BLD: 8 % (ref 5–13)
NEUTS SEG # BLD: 9.2 K/UL (ref 1.8–8)
NEUTS SEG NFR BLD: 84 % (ref 32–75)
NRBC # BLD: 0 K/UL (ref 0–0.01)
NRBC BLD-RTO: 0 PER 100 WBC
P-R INTERVAL, ECG05: 142 MS
PLATELET # BLD AUTO: 190 K/UL (ref 150–400)
PMV BLD AUTO: 9.8 FL (ref 8.9–12.9)
POTASSIUM SERPL-SCNC: 4.8 MMOL/L (ref 3.5–5.1)
Q-T INTERVAL, ECG07: 380 MS
QRS DURATION, ECG06: 74 MS
QTC CALCULATION (BEZET), ECG08: 464 MS
RBC # BLD AUTO: 2.09 M/UL (ref 3.8–5.2)
SODIUM SERPL-SCNC: 137 MMOL/L (ref 136–145)
VENTRICULAR RATE, ECG03: 90 BPM
WBC # BLD AUTO: 11.2 K/UL (ref 3.6–11)

## 2022-07-17 PROCEDURE — 74011000636 HC RX REV CODE- 636: Performed by: FAMILY MEDICINE

## 2022-07-17 PROCEDURE — C1769 GUIDE WIRE: HCPCS

## 2022-07-17 PROCEDURE — 74011250636 HC RX REV CODE- 250/636: Performed by: FAMILY MEDICINE

## 2022-07-17 PROCEDURE — 77030009378 HC DEV TORQ OLCT F/GWIRE MANIP COOK -A

## 2022-07-17 PROCEDURE — 36415 COLL VENOUS BLD VENIPUNCTURE: CPT

## 2022-07-17 PROCEDURE — 74011250636 HC RX REV CODE- 250/636: Performed by: SURGERY

## 2022-07-17 PROCEDURE — 74011000250 HC RX REV CODE- 250: Performed by: FAMILY MEDICINE

## 2022-07-17 PROCEDURE — 2709999900 HC NON-CHARGEABLE SUPPLY

## 2022-07-17 PROCEDURE — P9016 RBC LEUKOCYTES REDUCED: HCPCS

## 2022-07-17 PROCEDURE — 85018 HEMOGLOBIN: CPT

## 2022-07-17 PROCEDURE — 99232 SBSQ HOSP IP/OBS MODERATE 35: CPT | Performed by: SURGERY

## 2022-07-17 PROCEDURE — 74011250637 HC RX REV CODE- 250/637: Performed by: STUDENT IN AN ORGANIZED HEALTH CARE EDUCATION/TRAINING PROGRAM

## 2022-07-17 PROCEDURE — 85025 COMPLETE CBC W/AUTO DIFF WBC: CPT

## 2022-07-17 PROCEDURE — 65660000001 HC RM ICU INTERMED STEPDOWN

## 2022-07-17 PROCEDURE — 80048 BASIC METABOLIC PNL TOTAL CA: CPT

## 2022-07-17 PROCEDURE — 77030021532 HC CATH ANGI DX IMPRS MRTM -B

## 2022-07-17 PROCEDURE — 36430 TRANSFUSION BLD/BLD COMPNT: CPT

## 2022-07-17 RX ORDER — SODIUM CHLORIDE 9 MG/ML
250 INJECTION, SOLUTION INTRAVENOUS AS NEEDED
Status: DISCONTINUED | OUTPATIENT
Start: 2022-07-17 | End: 2022-07-19 | Stop reason: SDUPTHER

## 2022-07-17 RX ADMIN — SODIUM CHLORIDE, PRESERVATIVE FREE 10 ML: 5 INJECTION INTRAVENOUS at 14:08

## 2022-07-17 RX ADMIN — HYDROMORPHONE HYDROCHLORIDE 1 MG: 1 INJECTION, SOLUTION INTRAMUSCULAR; INTRAVENOUS; SUBCUTANEOUS at 15:06

## 2022-07-17 RX ADMIN — HYDROMORPHONE HYDROCHLORIDE 1 MG: 1 INJECTION, SOLUTION INTRAMUSCULAR; INTRAVENOUS; SUBCUTANEOUS at 05:45

## 2022-07-17 RX ADMIN — ONDANSETRON 4 MG: 2 INJECTION INTRAMUSCULAR; INTRAVENOUS at 21:03

## 2022-07-17 RX ADMIN — HYDROMORPHONE HYDROCHLORIDE 1 MG: 1 INJECTION, SOLUTION INTRAMUSCULAR; INTRAVENOUS; SUBCUTANEOUS at 21:03

## 2022-07-17 RX ADMIN — HYDROMORPHONE HYDROCHLORIDE 1 MG: 1 INJECTION, SOLUTION INTRAMUSCULAR; INTRAVENOUS; SUBCUTANEOUS at 09:54

## 2022-07-17 RX ADMIN — LIDOCAINE HYDROCHLORIDE 7 ML: 20 INJECTION, SOLUTION INFILTRATION; PERINEURAL at 01:09

## 2022-07-17 RX ADMIN — HYDROMORPHONE HYDROCHLORIDE 1 MG: 1 INJECTION, SOLUTION INTRAMUSCULAR; INTRAVENOUS; SUBCUTANEOUS at 01:42

## 2022-07-17 RX ADMIN — IOPAMIDOL 65 ML: 612 INJECTION, SOLUTION INTRAVENOUS at 01:10

## 2022-07-17 RX ADMIN — ONDANSETRON 4 MG: 2 INJECTION INTRAMUSCULAR; INTRAVENOUS at 14:58

## 2022-07-17 RX ADMIN — SODIUM CHLORIDE, PRESERVATIVE FREE 10 ML: 5 INJECTION INTRAVENOUS at 05:53

## 2022-07-17 RX ADMIN — ONDANSETRON 4 MG: 2 INJECTION INTRAMUSCULAR; INTRAVENOUS at 05:45

## 2022-07-17 RX ADMIN — POLYETHYLENE GLYCOL 3350 17 G: 17 POWDER, FOR SOLUTION ORAL at 09:44

## 2022-07-17 RX ADMIN — SENNOSIDES 8.6 MG: 8.6 TABLET, FILM COATED ORAL at 09:44

## 2022-07-17 RX ADMIN — Medication 4000 UNITS: at 00:24

## 2022-07-17 RX ADMIN — ONDANSETRON 4 MG: 2 INJECTION INTRAMUSCULAR; INTRAVENOUS at 01:42

## 2022-07-17 RX ADMIN — SODIUM CHLORIDE, PRESERVATIVE FREE 10 ML: 5 INJECTION INTRAVENOUS at 21:02

## 2022-07-17 NOTE — H&P
Interventional and Vascular Radiology History and Physical    Patient: Brandon Altman 74 y.o. female       Chief Complaint: hemoperitoneum     History of Present Illness: 74year old female with ruptured splenic capsular pseudoaneurysm and hemoperitoneum presents for partial splenic embolization. History:    Past Medical History:   Diagnosis Date    Hypertension      No family history on file. Social History     Socioeconomic History    Marital status:      Spouse name: Not on file    Number of children: Not on file    Years of education: Not on file    Highest education level: Not on file   Occupational History    Not on file   Tobacco Use    Smoking status: Current Every Day Smoker    Smokeless tobacco: Never Used   Substance and Sexual Activity    Alcohol use: Yes     Comment: most days    Drug use: Yes     Types: Marijuana    Sexual activity: Not on file   Other Topics Concern    Not on file   Social History Narrative    Not on file     Social Determinants of Health     Financial Resource Strain:     Difficulty of Paying Living Expenses: Not on file   Food Insecurity:     Worried About Running Out of Food in the Last Year: Not on file    Simin of Food in the Last Year: Not on file   Transportation Needs:     Lack of Transportation (Medical): Not on file    Lack of Transportation (Non-Medical):  Not on file   Physical Activity:     Days of Exercise per Week: Not on file    Minutes of Exercise per Session: Not on file   Stress:     Feeling of Stress : Not on file   Social Connections:     Frequency of Communication with Friends and Family: Not on file    Frequency of Social Gatherings with Friends and Family: Not on file    Attends Anabaptism Services: Not on file    Active Member of Clubs or Organizations: Not on file    Attends Club or Organization Meetings: Not on file    Marital Status: Not on file   Intimate Partner Violence:     Fear of Current or Ex-Partner: Not on file    Emotionally Abused: Not on file    Physically Abused: Not on file    Sexually Abused: Not on file   Housing Stability:     Unable to Pay for Housing in the Last Year: Not on file    Number of Places Lived in the Last Year: Not on file    Unstable Housing in the Last Year: Not on file       Allergies: Allergies   Allergen Reactions    Penicillins Unknown (comments)       Current Medications:  Current Facility-Administered Medications   Medication Dose Route Frequency    lidocaine (XYLOCAINE) 20 mg/mL (2 %) injection 400 mg  20 mL SubCUTAneous RAD ONCE    iopamidoL (ISOVUE 300) 61 % contrast injection 300 mL  300 mL IntraCATHeter RAD ONCE    heparin 4000 units/1000 mL (4 units/mL) in NS infusion **FOR ANGIO USE ONLY**   IntraVENous RAD PRN    fentaNYL citrate (PF) injection 12.5-200 mcg  12.5-200 mcg IntraVENous RAD PRN    0.9% sodium chloride infusion  25 mL/hr IntraVENous CONTINUOUS    midazolam (VERSED) injection 0.5-5 mg  0.5-5 mg IntraVENous RAD PRN    naloxone (NARCAN) injection 0.4 mg  0.4 mg IntraVENous RAD PRN    flumazeniL (ROMAZICON) 0.1 mg/mL injection 0.5 mg  0.5 mg IntraVENous RAD PRN     Current Outpatient Medications   Medication Sig    alum-mag hydroxide-simeth (MYLANTA) 200-200-20 mg/5 mL susp Take 30 mL by mouth every four (4) hours as needed for Indigestion, Heartburn or GI Pain for up to 5 days.      Facility-Administered Medications Ordered in Other Encounters   Medication Dose Route Frequency    alum-mag hydroxide-simeth (MYLANTA) oral suspension 30 mL  30 mL Oral Q4H PRN    polyethylene glycol (MIRALAX) packet 17 g  17 g Oral DAILY    senna (SENOKOT) tablet 8.6 mg  1 Tablet Oral DAILY    0.9% sodium chloride infusion 250 mL  250 mL IntraVENous PRN    0.9% sodium chloride infusion 250 mL  250 mL IntraVENous PRN    melatonin tablet 3 mg  3 mg Oral QHS    butalbital-acetaminophen-caffeine (FIORICET, ESGIC) -40 mg per tablet 1 Tablet  1 Tablet Oral Q4H PRN    nicotine (NICODERM CQ) 21 mg/24 hr patch 1 Patch  1 Patch TransDERmal DAILY    HYDROmorphone (DILAUDID) injection 1 mg  1 mg IntraVENous Q3H PRN    ondansetron (ZOFRAN) injection 4 mg  4 mg IntraVENous Q4H PRN    sodium chloride (NS) flush 5-40 mL  5-40 mL IntraVENous Q8H    sodium chloride (NS) flush 5-40 mL  5-40 mL IntraVENous PRN    acetaminophen (TYLENOL) tablet 650 mg  650 mg Oral Q4H PRN        Physical Exam:  Blood pressure 104/85, pulse (!) 107, resp. rate 28, SpO2 100 %. No acute distress  Good peripheral perfusion  Nonlabored respirations  Abdomen nondistended    Alerts:      Laboratory:      Recent Labs     07/16/22  1950   HGB 7.4*   HCT 22.0*   WBC 12.1*      BUN 9   CREA 0.78   K 4.0         Plan of Care/Planned Procedure:  Risks, benefits, and alternatives reviewed with patient and she agrees to proceed with the procedure.        JoseC ruz Ding MD

## 2022-07-17 NOTE — PROGRESS NOTES
An overhead rapid response was called at 1946. This RN arrived at bedside. Per primary RN, patient has been complaining of abdominal pain and lightheadedness. BP 85/68. Patient reports to this RN abdominal pain, lower back pain, and L arm pain. Patient tachycardic and hypotensive on the monitor. 250mL bolus ordered per protocol. STAT CBC drawn. Dr. Caden Stevens at bedside. Orders received for labs, NS fluid bolus, EKG, and, STAT KUB. EKG shows sinus tachycardia. Dr. Caden Stevens spoke with on call general surgeon, who referred us to IR. IR MD paged to Dr. Adela Rosado phone. 2027: HBG 7.4 from 8.1. STAT CT of abdomin/pelivs ordered. 2100: This RN and primary RN transported patient to CT at this time.

## 2022-07-17 NOTE — PROGRESS NOTES
1130 Dr German Denise requested to hold 1130 H&H because she is placing an order for blood transfusion.

## 2022-07-17 NOTE — ROUTINE PROCESS
Pt arrives via bed to angio department accompanied by RN and  for splenic embolization procedure. All assessments completed and consent was reviewed. Education given was regarding procedure, conscious sedation, post-procedure care and  management/follow-up. Opportunity for questions was provided and all questions and concerns were addressed.

## 2022-07-17 NOTE — PROGRESS NOTES
Problem: Falls - Risk of  Goal: *Absence of Falls  Description: Document Danie Monterroso Fall Risk and appropriate interventions in the flowsheet.   Outcome: Progressing Towards Goal  Note: Fall Risk Interventions:  Mobility Interventions: Assess mobility with egress test,Bed/chair exit alarm,Patient to call before getting OOB         Medication Interventions: Assess postural VS orthostatic hypotension,Bed/chair exit alarm,Evaluate medications/consider consulting pharmacy,Patient to call before getting OOB,Teach patient to arise slowly    Elimination Interventions: Bed/chair exit alarm,Call light in reach,Patient to call for help with toileting needs    History of Falls Interventions: Bed/chair exit alarm,Evaluate medications/consider consulting pharmacy         Problem: Patient Education: Go to Patient Education Activity  Goal: Patient/Family Education  Outcome: Progressing Towards Goal

## 2022-07-17 NOTE — PROGRESS NOTES
Upon on rapid response when receive. Rapid was called for low BP. Pt received bolus. Labs dawned and send. Pt had an order of 1 unit blood transfusion which was done. Pt went to CT for an abdominal scan. IR was consulted for an embolization which was successful . Pt came back from IR with dressing on right sie. No bleeding. Pt received pain medication as scheduled. Pt is now on 2 L NC.  was at bed side to comfort the patient. Pt is been monitored    Bedside shift change report given to Josr Crocker RN (oncoming nurse) by Alisia Ronquillo RN (offgoing nurse). Report included the following information SBAR, Kardex, Recent Results, Cardiac Rhythm ST and Quality Measures.

## 2022-07-17 NOTE — PROGRESS NOTES
Progress Note    Patient: Jyoti Koehler MRN: [id number]  SSN: [id number]    YOB: 1955  Age: 74 y.o. Sex: female      Admit Date: 2022    * No surgery found *    Procedure:      Subjective:     Ends of yesterday noted. Patient had been doing well and was actually going to be discharged but then started feeling lightheaded with increased abdominal pain. She was noted to be hypotensive and rapid response was called. She eventually underwent a CTA and splenic artery embolization. She states she is feeling a little bit better today still with abdominal pain. Objective:     Visit Vitals  BP (!) 143/89 (BP 1 Location: Left upper arm)   Pulse 89   Temp 98.7 °F (37.1 °C)   Resp 24   Ht 5' 3\" (1.6 m)   Wt 79 kg (174 lb 2.6 oz)   SpO2 97%   BMI 30.85 kg/m²       Temp (24hrs), Av.3 °F (36.8 °C), Min:97.7 °F (36.5 °C), Max:98.7 °F (37.1 °C)      Physical Exam:    General alert no acute distress  Lungs clear bilaterally  Heart regular rate and rhythm  Abdomen soft mild left upper quadrant tenderness no rebound or guarding    Data Review: images and reports reviewed  CTA- IMPRESSION     1. Increased peritoneal hemorrhage. Increased clot surrounding the spleen but  active extravasation is not demonstrated    IR- 1. Significant mass effect on the splenic parenchyma due to left upper quadrant  hematoma. No definitive extravasation or pseudoaneurysm could be identified. 2.  The splenic artery was embolized using temporary Gelfoam embolic. Lab Review: All lab results for the last 24 hours reviewed.   Recent Results (from the past 24 hour(s))   CBC W/O DIFF    Collection Time: 22  7:50 PM   Result Value Ref Range    WBC 12.1 (H) 3.6 - 11.0 K/uL    RBC 2.22 (L) 3.80 - 5.20 M/uL    HGB 7.4 (L) 11.5 - 16.0 g/dL    HCT 22.0 (L) 35.0 - 47.0 %    MCV 99.1 (H) 80.0 - 99.0 FL    MCH 33.3 26.0 - 34.0 PG    MCHC 33.6 30.0 - 36.5 g/dL    RDW 13.6 11.5 - 14.5 %    PLATELET 559 207 - 382 K/uL    MPV 9.7 8.9 - 12.9 FL    NRBC 0.0 0  WBC    ABSOLUTE NRBC 0.00 0.00 - 6.47 K/uL   METABOLIC PANEL, BASIC    Collection Time: 07/16/22  7:50 PM   Result Value Ref Range    Sodium 137 136 - 145 mmol/L    Potassium 4.0 3.5 - 5.1 mmol/L    Chloride 107 97 - 108 mmol/L    CO2 24 21 - 32 mmol/L    Anion gap 6 5 - 15 mmol/L    Glucose 155 (H) 65 - 100 mg/dL    BUN 9 6 - 20 MG/DL    Creatinine 0.78 0.55 - 1.02 MG/DL    BUN/Creatinine ratio 12 12 - 20      GFR est AA >60 >60 ml/min/1.73m2    GFR est non-AA >60 >60 ml/min/1.73m2    Calcium 8.6 8.5 - 10.1 MG/DL   MAGNESIUM    Collection Time: 07/16/22  7:50 PM   Result Value Ref Range    Magnesium 1.9 1.6 - 2.4 mg/dL   EKG, 12 LEAD, INITIAL    Collection Time: 07/16/22  8:00 PM   Result Value Ref Range    Ventricular Rate 90 BPM    Atrial Rate 90 BPM    P-R Interval 142 ms    QRS Duration 74 ms    Q-T Interval 380 ms    QTC Calculation (Bezet) 464 ms    Calculated P Axis 65 degrees    Calculated R Axis -17 degrees    Calculated T Axis 63 degrees    Diagnosis       Normal sinus rhythm  Possible Left atrial enlargement  When compared with ECG of 12-JUL-2022 20:04,  QT has lengthened  Confirmed by Janey Estrada ([id number]) on 7/17/2022 10:11:55 AM     TROPONIN-HIGH SENSITIVITY    Collection Time: 07/16/22  8:02 PM   Result Value Ref Range    Troponin-High Sensitivity 7 0 - 51 ng/L   LACTIC ACID    Collection Time: 07/16/22  8:02 PM   Result Value Ref Range    Lactic acid 1.7 0.4 - 2.0 MMOL/L   TYPE & SCREEN    Collection Time: 07/16/22  8:02 PM   Result Value Ref Range    Crossmatch Expiration 07/19/2022,2359     ABO/Rh(D) A POSITIVE     Antibody screen NEG     Unit number [id number]     Blood component type [facility]     Unit division 00     Status of unit TRANSFUSED     Crossmatch result Compatible     Unit number [id number]     Blood component type [facility]     Unit division 00     Status of unit ISSUED     Crossmatch result Compatible    RBC, ALLOCATE    Collection Time: 07/16/22  8:45 PM Result Value Ref Range    HISTORY CHECKED? Historical check performed    CBC WITH AUTOMATED DIFF    Collection Time: 07/17/22  5:44 AM   Result Value Ref Range    WBC 11.2 (H) 3.6 - 11.0 K/uL    RBC 2.09 (L) 3.80 - 5.20 M/uL    HGB 7.1 (L) 11.5 - 16.0 g/dL    HCT 20.8 (L) 35.0 - 47.0 %    MCV 99.5 (H) 80.0 - 99.0 FL    MCH 34.0 26.0 - 34.0 PG    MCHC 34.1 30.0 - 36.5 g/dL    RDW 13.8 11.5 - 14.5 %    PLATELET 999 920 - 544 K/uL    MPV 9.8 8.9 - 12.9 FL    NRBC 0.0 0  WBC    ABSOLUTE NRBC 0.00 0.00 - 0.01 K/uL    NEUTROPHILS 84 (H) 32 - 75 %    LYMPHOCYTES 8 (L) 12 - 49 %    MONOCYTES 8 5 - 13 %    EOSINOPHILS 0 0 - 7 %    BASOPHILS 0 0 - 1 %    IMMATURE GRANULOCYTES 0 0.0 - 0.5 %    ABS. NEUTROPHILS 9.2 (H) 1.8 - 8.0 K/UL    ABS. LYMPHOCYTES 0.9 0.8 - 3.5 K/UL    ABS. MONOCYTES 0.9 0.0 - 1.0 K/UL    ABS. EOSINOPHILS 0.0 0.0 - 0.4 K/UL    ABS. BASOPHILS 0.0 0.0 - 0.1 K/UL    ABS. IMM. GRANS. 0.1 (H) 0.00 - 0.04 K/UL    DF AUTOMATED     METABOLIC PANEL, BASIC    Collection Time: 07/17/22  5:44 AM   Result Value Ref Range    Sodium 137 136 - 145 mmol/L    Potassium 4.8 3.5 - 5.1 mmol/L    Chloride 108 97 - 108 mmol/L    CO2 22 21 - 32 mmol/L    Anion gap 7 5 - 15 mmol/L    Glucose 140 (H) 65 - 100 mg/dL    BUN 16 6 - 20 MG/DL    Creatinine 1.00 0.55 - 1.02 MG/DL    BUN/Creatinine ratio 16 12 - 20      GFR est AA >60 >60 ml/min/1.73m2    GFR est non-AA 55 (L) >60 ml/min/1.73m2    Calcium 7.9 (L) 8.5 - 10.1 MG/DL   RBC, ALLOCATE    Collection Time: 07/17/22 11:45 AM   Result Value Ref Range    HISTORY CHECKED? Historical check performed        Assessment:     Hospital Problems  Never Reviewed          Codes Class Noted POA    Splenic laceration ICD-10-CM: T91.405Q  ICD-9-CM: 865.09  7/12/2022 Unknown              Plan/Recommendations/Medical Decision Making:   Recurrent splenic bleed now status post IR embolization. This will take care of the problem. We will continue bedrest today.   May be able to start on clear liquids tomorrow.   Continue to monitor H&H.

## 2022-07-17 NOTE — PROGRESS NOTES
S: RRT called for symptomatic hypotension. Ms. Johnson Ulloa is a 74 y.o. female who was admitted on 7/13 for splenic laceration with hemorrhage, and blood loss anemia s/p colonoscopy. She c/o worsening abdominal pain left arm pain, and nausea at this time. Back pain became acutely worse over the past hour. O: BP 85/68,   Gen:  Pt diaphoretic, and looks unwell  Pulm: CTAB  CV: tachycardia, regular  Abd: abdomen TTP with gaurding    A/P:    #Hypotension  -most likely hemorrhagic 2/2 known blood loss anemia from splenic laceration  -stat CBC with Hgb from 8.1>7.4, and pt. Now with leukocytosis to 12.1  -LR bolus, and 2 units placed on hold with one unit to be transfused stat. BP is responsive to this intervention  -stat CTA abdomen/pelvis with increased peritoneal hemorrhage, and clot surrounding the spleen. EKG and trop done 2/2 left arm pain, and are negative for ischemia so I think this is referred pain  -gen surgery, and IR contacted with plan for IR embolization tonight. Appreciate rec's.  -will f/u post-procedure    Rosalino Dill MD    I personally spent 60 minutes of critical care time. This is time spent at this critically ill patient's bedside actively involved in patient care as well as the coordination of care and discussions with the patient's family. This does not include any procedural time which has been billed separately.

## 2022-07-17 NOTE — PROGRESS NOTES
[address] [facility] Adult  Hospitalist Group                                                                                          Hospitalist Progress Note  Aimee Cabral MD  Answering service: [phone] from in house phone        Date of Service:  2022  NAME:  Jeevan Chatterjee  :  1955  MRN:  [id number]      Admission Summary:   HPI: Anamika Hankins is a 74 y.o. female who presents with abdominal pain     Patient had colonoscopy done yesterday, started experiencing left-sided abdominal pain with left shoulder pain, symptoms started around 3 PM, immediately after waking up from colonoscopy, patient continued to have nausea associated with abdominal pain, she took some Tylenol at home, symptoms did not jasper, called her GI doctor and came to the ER, patient came to [facility], had imaging done and was found to have splenic laceration and was requested to be admitted to the hospitalist service, denies any other complaints or problems\"       Interval history / Subjective:   Seen examined at bedside. Rapid last night for hypotension peritoneal hemorrhage from splenic lac.  present at bedside.   She states her abdominal pain is significantly improved from yesterday     Assessment & Plan:     Splenic Lac > peritoneal hemorrhage with thrombus of the spleen  Acute blood loss anemia  Acute PE LLL  Hypocalcemia   -rapid called  for hypotension, bolused IV fluids stat CTA of the abdomen pelvis confirmed increased peritoneal hemorrhage with thrombus surrounding the spleen  -Post IR embolization splenic artery    -hgb 7.1 extremely high risk for bleeding, tranfuse 1 U PRBC, cont to monitor H&H q 8   -CTA chest confirmed LLL PE   -Discussed risk versus benefits with patient at this point in time increased risk for bleed from splenic lac outweights benefit of ac for PE   -placed IVC filter 7/15  -Continue to trend H&H's, transfuse for hemoglobin less than 7  -ca improved s/p supplementation continue to monitor  -follow cultures > bld cx ngtd   -advance diet per surgery/IR       Code status: full   Prophylaxis: scd   Care Plan discussed with: patient/family, nurse  Anticipated Disposition: 48 hrs      Hospital Problems  Never Reviewed          Codes Class Noted POA    Splenic laceration ICD-10-CM: B86.073O  ICD-9-CM: 865.09  7/12/2022 Unknown                Review of Systems:   A comprehensive review of systems was negative except for that written in the HPI. Vital Signs:    Last 24hrs VS reviewed since prior progress note. Most recent are:  Visit Vitals  /87   Pulse 93   Temp 98.7 °F (37.1 °C)   Resp 26   Ht 5' 3\" (1.6 m)   Wt 79 kg (174 lb 2.6 oz)   SpO2 96%   BMI 30.85 kg/m²         Intake/Output Summary (Last 24 hours) at 7/17/2022 1434  Last data filed at 7/17/2022 0207  Gross per 24 hour   Intake 620 ml   Output --   Net 620 ml        Physical Examination:     I had a face to face encounter with this patient and independently examined them on 7/17/2022 as outlined below:          Constitutional:  No acute distress, cooperative, pleasant    ENT:  Oral mucosa moist, oropharynx benign. Resp:  CTA bilaterally. No wheezing/rhonchi/rales. No accessory muscle use. CV:  Regular rhythm, normal rate, no murmurs, gallops, rubs    GI:  Soft, non distended, non tender. normoactive bowel sounds, no hepatosplenomegaly     Musculoskeletal:  No edema, warm, 2+ pulses throughout    Neurologic:  Moves all extremities.   AAOx3, CN II-XII reviewed            Data Review:    Review and/or order of clinical lab test  Review and/or order of tests in the radiology section of CPT  Review and/or order of tests in the medicine section of CPT      Labs:     Recent Labs     07/17/22  0544 07/16/22 1950   WBC 11.2* 12.1*   HGB 7.1* 7.4*   HCT 20.8* 22.0*    275     Recent Labs     07/17/22  0544 07/16/22  1950 07/16/22  0549    137 138   K 4.8 4.0 3.6    107 108   CO2 22 24 25   BUN 16 9 4*   CREA 1.00 0.78 0.51*   * 155* 100   CA 7.9* 8.6 8.7   MG  --  1.9  --      No results for input(s): ALT, AP, TBIL, TBILI, TP, ALB, GLOB, GGT, AML, LPSE in the last 72 hours. No lab exists for component: SGOT, GPT, AMYP, HLPSE  No results for input(s): INR, PTP, APTT, INREXT, INREXT in the last 72 hours. No results for input(s): FE, TIBC, PSAT, FERR in the last 72 hours. No results found for: FOL, RBCF   No results for input(s): PH, PCO2, PO2 in the last 72 hours. No results for input(s): CPK, CKNDX, TROIQ in the last 72 hours.     No lab exists for component: CPKMB  No results found for: CHOL, CHOLX, CHLST, CHOLV, HDL, HDLP, LDL, LDLC, DLDLP, TGLX, TRIGL, TRIGP, CHHD, CHHDX  No results found for: GLUCPOC  No results found for: COLOR, APPRN, SPGRU, REFSG, FELY, PROTU, GLUCU, KETU, BILU, UROU, KAITLIN, LEUKU, GLUKE, EPSU, BACTU, WBCU, RBCU, CASTS, UCRY      Medications Reviewed:     Current Facility-Administered Medications   Medication Dose Route Frequency    0.9% sodium chloride infusion 250 mL  250 mL IntraVENous PRN    alum-mag hydroxide-simeth (MYLANTA) oral suspension 30 mL  30 mL Oral Q4H PRN    0.9% sodium chloride infusion 250 mL  250 mL IntraVENous PRN    0.9% sodium chloride infusion 250 mL  250 mL IntraVENous PRN    melatonin tablet 3 mg  3 mg Oral QHS    butalbital-acetaminophen-caffeine (FIORICET, ESGIC) -40 mg per tablet 1 Tablet  1 Tablet Oral Q4H PRN    nicotine (NICODERM CQ) 21 mg/24 hr patch 1 Patch  1 Patch TransDERmal DAILY    HYDROmorphone (DILAUDID) injection 1 mg  1 mg IntraVENous Q3H PRN    ondansetron (ZOFRAN) injection 4 mg  4 mg IntraVENous Q4H PRN    sodium chloride (NS) flush 5-40 mL  5-40 mL IntraVENous Q8H    sodium chloride (NS) flush 5-40 mL  5-40 mL IntraVENous PRN    acetaminophen (TYLENOL) tablet 650 mg  650 mg Oral Q4H PRN     ______________________________________________________________________  EXPECTED LENGTH OF STAY: 4d 7h  ACTUAL LENGTH OF STAY:          4                 Aniket Howell MD

## 2022-07-17 NOTE — PROGRESS NOTES
Name of procedure:  Splenic embolization    Sedation medications given: N/A     Procedure tolerated: well     Total Procedure time: 50 min     Vital Signs: stable     Any complications related to procedure: see orders     Post Procedure Care Needed/order sets placed in connect care: see orders     Pt tolerated procedure well. VSS. No C/O pain. Dressing to site D&I. No bleeding or hematoma noted to site. TRANSFER - OUT REPORT:    Verbal report given to Paulie RN(name) on Diana Landeros  being transferred to [facility]) for routine progression of care       Report consisted of patients Situation, Background, Assessment and   Recommendations(SBAR). Information from the following report(s) SBAR and Procedure Summary was reviewed with the receiving nurse. Lines:   Peripheral IV 07/12/22 Left Antecubital (Active)   Site Assessment Clean, dry, & intact 07/16/22 1607   Phlebitis Assessment 0 07/16/22 1607   Infiltration Assessment 0 07/16/22 1607   Dressing Status Clean, dry, & intact 07/16/22 1607   Dressing Type Transparent;Tape 07/16/22 1607   Hub Color/Line Status Pink; Infusing 07/16/22 1607   Action Taken Open ports on tubing capped 07/16/22 1607   Alcohol Cap Used Yes 07/16/22 1607       Peripheral IV 07/17/22 Right Antecubital (Active)        Opportunity for questions and clarification was provided.       Patient transported with:   Monitor  O2 @ 1 liters  Registered Nurse  [employer]

## 2022-07-18 LAB
ANION GAP SERPL CALC-SCNC: 7 MMOL/L (ref 5–15)
BACTERIA SPEC CULT: NORMAL
BASOPHILS # BLD: 0 K/UL (ref 0–0.1)
BASOPHILS NFR BLD: 0 % (ref 0–1)
BUN SERPL-MCNC: 31 MG/DL (ref 6–20)
BUN/CREAT SERPL: 27 (ref 12–20)
CALCIUM SERPL-MCNC: 8.3 MG/DL (ref 8.5–10.1)
CHLORIDE SERPL-SCNC: 110 MMOL/L (ref 97–108)
CO2 SERPL-SCNC: 23 MMOL/L (ref 21–32)
CREAT SERPL-MCNC: 1.13 MG/DL (ref 0.55–1.02)
DIFFERENTIAL METHOD BLD: ABNORMAL
EOSINOPHIL # BLD: 0 K/UL (ref 0–0.4)
EOSINOPHIL NFR BLD: 0 % (ref 0–7)
ERYTHROCYTE [DISTWIDTH] IN BLOOD BY AUTOMATED COUNT: 23.4 % (ref 11.5–14.5)
GLUCOSE SERPL-MCNC: 125 MG/DL (ref 65–100)
HCT VFR BLD AUTO: 21.5 % (ref 35–47)
HGB BLD-MCNC: 6.1 G/DL (ref 11.5–16)
HGB BLD-MCNC: 7.2 G/DL (ref 11.5–16)
HISTORY CHECKED?,CKHIST: NORMAL
IMM GRANULOCYTES # BLD AUTO: 0.1 K/UL (ref 0–0.04)
IMM GRANULOCYTES NFR BLD AUTO: 1 % (ref 0–0.5)
LYMPHOCYTES # BLD: 1.3 K/UL (ref 0.8–3.5)
LYMPHOCYTES NFR BLD: 9 % (ref 12–49)
MCH RBC QN AUTO: 30.5 PG (ref 26–34)
MCHC RBC AUTO-ENTMCNC: 33.5 G/DL (ref 30–36.5)
MCV RBC AUTO: 91.1 FL (ref 80–99)
MONOCYTES # BLD: 1.1 K/UL (ref 0–1)
MONOCYTES NFR BLD: 8 % (ref 5–13)
NEUTS SEG # BLD: 11.7 K/UL (ref 1.8–8)
NEUTS SEG NFR BLD: 82 % (ref 32–75)
NRBC # BLD: 0.15 K/UL (ref 0–0.01)
NRBC BLD-RTO: 1.1 PER 100 WBC
PLATELET # BLD AUTO: 173 K/UL (ref 150–400)
PMV BLD AUTO: 10.3 FL (ref 8.9–12.9)
POTASSIUM SERPL-SCNC: 4.5 MMOL/L (ref 3.5–5.1)
RBC # BLD AUTO: 2.36 M/UL (ref 3.8–5.2)
RBC MORPH BLD: ABNORMAL
RBC MORPH BLD: ABNORMAL
SERVICE CMNT-IMP: NORMAL
SODIUM SERPL-SCNC: 140 MMOL/L (ref 136–145)
WBC # BLD AUTO: 14.2 K/UL (ref 3.6–11)

## 2022-07-18 PROCEDURE — 74011250636 HC RX REV CODE- 250/636: Performed by: SURGERY

## 2022-07-18 PROCEDURE — 65660000001 HC RM ICU INTERMED STEPDOWN

## 2022-07-18 PROCEDURE — 85018 HEMOGLOBIN: CPT

## 2022-07-18 PROCEDURE — 74011000250 HC RX REV CODE- 250: Performed by: FAMILY MEDICINE

## 2022-07-18 PROCEDURE — 90734 MENACWYD/MENACWYCRM VACC IM: CPT | Performed by: SURGERY

## 2022-07-18 PROCEDURE — 36430 TRANSFUSION BLD/BLD COMPNT: CPT

## 2022-07-18 PROCEDURE — 36415 COLL VENOUS BLD VENIPUNCTURE: CPT

## 2022-07-18 PROCEDURE — 80048 BASIC METABOLIC PNL TOTAL CA: CPT

## 2022-07-18 PROCEDURE — 97116 GAIT TRAINING THERAPY: CPT

## 2022-07-18 PROCEDURE — 97530 THERAPEUTIC ACTIVITIES: CPT

## 2022-07-18 PROCEDURE — 74011250637 HC RX REV CODE- 250/637: Performed by: FAMILY MEDICINE

## 2022-07-18 PROCEDURE — 74011250636 HC RX REV CODE- 250/636: Performed by: STUDENT IN AN ORGANIZED HEALTH CARE EDUCATION/TRAINING PROGRAM

## 2022-07-18 PROCEDURE — 85025 COMPLETE CBC W/AUTO DIFF WBC: CPT

## 2022-07-18 PROCEDURE — 99232 SBSQ HOSP IP/OBS MODERATE 35: CPT | Performed by: SURGERY

## 2022-07-18 PROCEDURE — 74011250637 HC RX REV CODE- 250/637: Performed by: STUDENT IN AN ORGANIZED HEALTH CARE EDUCATION/TRAINING PROGRAM

## 2022-07-18 PROCEDURE — P9016 RBC LEUKOCYTES REDUCED: HCPCS

## 2022-07-18 PROCEDURE — 97161 PT EVAL LOW COMPLEX 20 MIN: CPT

## 2022-07-18 RX ORDER — HYDROMORPHONE HYDROCHLORIDE 1 MG/ML
1 INJECTION, SOLUTION INTRAMUSCULAR; INTRAVENOUS; SUBCUTANEOUS
Status: DISCONTINUED | OUTPATIENT
Start: 2022-07-18 | End: 2022-07-22 | Stop reason: HOSPADM

## 2022-07-18 RX ORDER — OXYCODONE AND ACETAMINOPHEN 10; 325 MG/1; MG/1
1 TABLET ORAL
Status: DISCONTINUED | OUTPATIENT
Start: 2022-07-18 | End: 2022-07-22 | Stop reason: HOSPADM

## 2022-07-18 RX ORDER — SODIUM CHLORIDE 9 MG/ML
25 INJECTION, SOLUTION INTRAVENOUS CONTINUOUS
Status: DISCONTINUED | OUTPATIENT
Start: 2022-07-18 | End: 2022-07-20

## 2022-07-18 RX ORDER — OXYCODONE AND ACETAMINOPHEN 5; 325 MG/1; MG/1
1 TABLET ORAL
Status: DISCONTINUED | OUTPATIENT
Start: 2022-07-18 | End: 2022-07-22 | Stop reason: HOSPADM

## 2022-07-18 RX ORDER — SODIUM CHLORIDE 9 MG/ML
250 INJECTION, SOLUTION INTRAVENOUS AS NEEDED
Status: DISCONTINUED | OUTPATIENT
Start: 2022-07-18 | End: 2022-07-19 | Stop reason: SDUPTHER

## 2022-07-18 RX ADMIN — ONDANSETRON 4 MG: 2 INJECTION INTRAMUSCULAR; INTRAVENOUS at 03:33

## 2022-07-18 RX ADMIN — HYDROMORPHONE HYDROCHLORIDE 1 MG: 1 INJECTION, SOLUTION INTRAMUSCULAR; INTRAVENOUS; SUBCUTANEOUS at 22:33

## 2022-07-18 RX ADMIN — Medication 0.5 ML: at 20:16

## 2022-07-18 RX ADMIN — HYDROMORPHONE HYDROCHLORIDE 1 MG: 1 INJECTION, SOLUTION INTRAMUSCULAR; INTRAVENOUS; SUBCUTANEOUS at 16:07

## 2022-07-18 RX ADMIN — SODIUM CHLORIDE, PRESERVATIVE FREE 10 ML: 5 INJECTION INTRAVENOUS at 22:33

## 2022-07-18 RX ADMIN — SODIUM CHLORIDE, PRESERVATIVE FREE 10 ML: 5 INJECTION INTRAVENOUS at 06:32

## 2022-07-18 RX ADMIN — ONDANSETRON 4 MG: 2 INJECTION INTRAMUSCULAR; INTRAVENOUS at 10:29

## 2022-07-18 RX ADMIN — HYDROMORPHONE HYDROCHLORIDE 1 MG: 1 INJECTION, SOLUTION INTRAMUSCULAR; INTRAVENOUS; SUBCUTANEOUS at 10:25

## 2022-07-18 RX ADMIN — SODIUM CHLORIDE 75 ML/HR: 9 INJECTION, SOLUTION INTRAVENOUS at 10:24

## 2022-07-18 RX ADMIN — ONDANSETRON 4 MG: 2 INJECTION INTRAMUSCULAR; INTRAVENOUS at 16:07

## 2022-07-18 RX ADMIN — HYDROMORPHONE HYDROCHLORIDE 1 MG: 1 INJECTION, SOLUTION INTRAMUSCULAR; INTRAVENOUS; SUBCUTANEOUS at 03:33

## 2022-07-18 RX ADMIN — ACETAMINOPHEN 650 MG: 325 TABLET ORAL at 10:25

## 2022-07-18 NOTE — PROGRESS NOTES
Upon receiving pt, she was in bed resting. Pt received her pain medication once. Pt is sleeping and be monitored. 1 unit of blood given. Pt tolerated transfusion well. Pt is now resting No major event. Labs drawn and send to the labPt is been monitored    Bedside shift change report given to Radha Lambert RN (oncoming nurse) by Kia Rios (offgoing nurse). Report included the following information SBAR, Kardex, Recent Results, Cardiac Rhythm NSR and Alarm Parameters .

## 2022-07-18 NOTE — PROGRESS NOTES
[address] [facility] Adult  Hospitalist Group                                                                                          Hospitalist Progress Note  Scooby Granger MD  Answering service: [phone] OR [phone] from in house phone        Date of Service:  2022  NAME:  Zion Álvarez  :  1955  MRN:  [id number]      Admission Summary:   HPI: Chinmay Perez is a 74 y.o. female who presents with abdominal pain     Patient had colonoscopy done yesterday, started experiencing left-sided abdominal pain with left shoulder pain, symptoms started around 3 PM, immediately after waking up from colonoscopy, patient continued to have nausea associated with abdominal pain, she took some Tylenol at home, symptoms did not jasper, called her GI doctor and came to the ER, patient came to [facility], had imaging done and was found to have splenic laceration and was requested to be admitted to the hospitalist service, denies any other complaints or problems\"       Interval history / Subjective:   Seen examined at bedside. Feels well, resting in bed, no sig abdominal pain. Will work with PT today.  present at bedside.   Hgb slightly dowtrending      Assessment & Plan:     Splenic Lac > peritoneal hemorrhage with thrombus of the spleen  Acute blood loss anemia  Acute PE LLL  Hypocalcemia   -rapid called  for hypotension, bolused IV fluids stat CTA of the abdomen pelvis confirmed increased peritoneal hemorrhage with thrombus surrounding the spleen  -Post IR embolization splenic artery    -hgb 7.2 still high risk for bleeding,  cont to monitor H&H q 8   -s/p 3 units prbc for hospital course  -CTA chest confirmed LLL PE   -Discussed risk versus benefits with patient at this point in time increased risk for bleed from splenic lac outweights benefit of ac for PE   -placed IVC filter 7/15  -Continue to trend H&H's, transfuse for hemoglobin less than 7  -ca improved s/p supplementation continue to monitor  -follow cultures > bld cx ngtd   -advance diet per surgery/IR  -vaccinated for splenic orgs 7/18     Work with PT/OT, mobilize as tolerated        Code status: full   Prophylaxis: scd   Care Plan discussed with: patient/family, nurse  Anticipated Disposition: >48 hrs      Hospital Problems  Never Reviewed          Codes Class Noted POA    Splenic laceration ICD-10-CM: D34.395T  ICD-9-CM: 865.09  7/12/2022 Unknown                Review of Systems:   A comprehensive review of systems was negative except for that written in the HPI. Vital Signs:    Last 24hrs VS reviewed since prior progress note. Most recent are:  Visit Vitals  /79 (BP 1 Location: Left upper arm, BP Patient Position: At rest)   Pulse 87   Temp 98.8 °F (37.1 °C)   Resp 21   Ht 5' 3\" (1.6 m)   Wt 79 kg (174 lb 2.6 oz)   SpO2 95%   BMI 30.85 kg/m²         Intake/Output Summary (Last 24 hours) at 7/18/2022 1436  Last data filed at 7/17/2022 1745  Gross per 24 hour   Intake 492.5 ml   Output --   Net 492.5 ml        Physical Examination:     I had a face to face encounter with this patient and independently examined them on 7/18/2022 as outlined below:          Constitutional:  No acute distress, cooperative, pleasant    ENT:  Oral mucosa moist, oropharynx benign. Resp:  CTA bilaterally. No wheezing/rhonchi/rales. No accessory muscle use. CV:  Regular rhythm, normal rate, no murmurs, gallops, rubs    GI:  Soft, non distended, non tender. normoactive bowel sounds, no hepatosplenomegaly     Musculoskeletal:  No edema, warm, 2+ pulses throughout    Neurologic:  Moves all extremities.   AAOx3, CN II-XII reviewed            Data Review:    Review and/or order of clinical lab test  Review and/or order of tests in the radiology section of CPT  Review and/or order of tests in the medicine section of CPT      Labs:     Recent Labs     07/18/22  0400 07/17/22 2010 07/17/22  0544 07/17/22  0544   WBC 14.2*  --   --  11.2*   HGB 7.2* 8.5* < > 7.1*   HCT 21.5*  --   --  20.8*     --   --  190    < > = values in this interval not displayed. Recent Labs     07/18/22  0400 07/17/22  0544 07/16/22  1950    137 137   K 4.5 4.8 4.0   * 108 107   CO2 23 22 24   BUN 31* 16 9   CREA 1.13* 1.00 0.78   * 140* 155*   CA 8.3* 7.9* 8.6   MG  --   --  1.9     No results for input(s): ALT, AP, TBIL, TBILI, TP, ALB, GLOB, GGT, AML, LPSE in the last 72 hours. No lab exists for component: SGOT, GPT, AMYP, HLPSE  No results for input(s): INR, PTP, APTT, INREXT, INREXT in the last 72 hours. No results for input(s): FE, TIBC, PSAT, FERR in the last 72 hours. No results found for: FOL, RBCF   No results for input(s): PH, PCO2, PO2 in the last 72 hours. No results for input(s): CPK, CKNDX, TROIQ in the last 72 hours.     No lab exists for component: CPKMB  No results found for: CHOL, CHOLX, CHLST, CHOLV, HDL, HDLP, LDL, LDLC, DLDLP, TGLX, TRIGL, TRIGP, CHHD, CHHDX  No results found for: GLUCPOC  No results found for: COLOR, APPRN, SPGRU, REFSG, FELY, PROTU, GLUCU, KETU, BILU, UROU, KAITLIN, LEUKU, GLUKE, EPSU, BACTU, WBCU, RBCU, CASTS, UCRY      Medications Reviewed:     Current Facility-Administered Medications   Medication Dose Route Frequency    pneumococcal 23-valent (PNEUMOVAX 23) injection 0.5 mL  0.5 mL IntraMUSCular ONCE    haemophilus b polysac conj (PEDVAX HIB) injection 7.5 mcg  0.5 mL IntraMUSCular ONCE    0.9% sodium chloride infusion 250 mL  250 mL IntraVENous PRN    HYDROmorphone (DILAUDID) injection 1 mg  1 mg IntraVENous Q2H PRN    oxyCODONE-acetaminophen (PERCOCET 10)  mg per tablet 1 Tablet  1 Tablet Oral Q4H PRN    oxyCODONE-acetaminophen (PERCOCET) 5-325 mg per tablet 1 Tablet  1 Tablet Oral Q4H PRN    0.9% sodium chloride infusion  75 mL/hr IntraVENous CONTINUOUS    meningococcal ACYW-135 diphtheria (PF) (MENVEO) injection (Elizabeth lyophilized vial) 0.5 mL  0.5 mL IntraMUSCular ONCE    And    meningococcal ACYW-135 diphtheria (PF) (MENVEO) injection (MenCYW-135 diluent vial) 1 Each  1 Each IntraMUSCular ONCE    0.9% sodium chloride infusion 250 mL  250 mL IntraVENous PRN    alum-mag hydroxide-simeth (MYLANTA) oral suspension 30 mL  30 mL Oral Q4H PRN    0.9% sodium chloride infusion 250 mL  250 mL IntraVENous PRN    0.9% sodium chloride infusion 250 mL  250 mL IntraVENous PRN    melatonin tablet 3 mg  3 mg Oral QHS    butalbital-acetaminophen-caffeine (FIORICET, ESGIC) -40 mg per tablet 1 Tablet  1 Tablet Oral Q4H PRN    nicotine (NICODERM CQ) 21 mg/24 hr patch 1 Patch  1 Patch TransDERmal DAILY    ondansetron (ZOFRAN) injection 4 mg  4 mg IntraVENous Q4H PRN    sodium chloride (NS) flush 5-40 mL  5-40 mL IntraVENous Q8H    sodium chloride (NS) flush 5-40 mL  5-40 mL IntraVENous PRN    acetaminophen (TYLENOL) tablet 650 mg  650 mg Oral Q4H PRN     ______________________________________________________________________  EXPECTED LENGTH OF STAY: 4d 7h  ACTUAL LENGTH OF STAY:          5                 Rosie Harding MD

## 2022-07-18 NOTE — PROGRESS NOTES
Problem: Mobility Impaired (Adult and Pediatric)  Goal: *Acute Goals and Plan of Care (Insert Text)  Description: FUNCTIONAL STATUS PRIOR TO ADMISSION: Patient was independent and active without use of DME.    HOME SUPPORT PRIOR TO ADMISSION: The patient lived with spouse but did not require assist.    Physical Therapy Goals  Initiated 7/18/2022  1. Patient will move from supine to sit and sit to supine  in bed with independence within 7 day(s). 2.  Patient will transfer from bed to chair and chair to bed with independence using the least restrictive device within 7 day(s). 3.  Patient will perform sit to stand with independence within 7 day(s). 4.  Patient will ambulate with independence for 100 feet with the least restrictive device within 7 day(s). 5.  Patient will ascend/descend 16 stairs with one handrail(s) with independence within 7 day(s). Outcome: Progressing Towards Goal   PHYSICAL THERAPY EVALUATION  Patient: Mohan Conde (75 y.o. female)  Date: 7/18/2022  Primary Diagnosis: Splenic laceration [S36.039A]       Precautions:   Fall      ASSESSMENT  Based on the objective data described below, the patient presents with impaired activity tolerance, increased pain, impaired strength, increased need for assistance and high risk for falls. 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 Patient educated on log roll to reduce abdominal strain and completed supine > sit with min A x 1. Patient with good static sitting balance. Patient noted to have persistent anemia and hypotension in chart, however patient with stable BP supine > sit > post activity sitting (see below). Patient completed sit <> stand with min A x 1 and required stabilizing assistance. Patient ambulated 20 feet in room with min A - CGA progressing from bilateral HHA to one HHA to no HHA.  Patient with slightly unsteady and slow gait. Patient limited in further OOB mobility 2/2 endorsement of dizziness and abdominal pain. Patient returned to chair and positioned to comfort and safety. Of note, patient noted to have slightly diminished but functional strength in L LE compared to R LE, and patient endorsing feelings of L UE weakness compared to R which patient states is not normal for her; this was relayed to RN. Patient initially on 2L O2 at rest, but demonstrated stable SPO2 on RA >90% throughout session, and patient left off supplemental O2 at end of session. Patient will benefit from continued PT while admitted but anticipate patient able to return home with HHPT and family assist pending continued progress. Patient Vitals for the past 4 hrs:   Pulse Resp BP SpO2   07/18/22 1504 (!) 122 22 121/77 sitting post-activity 91 % RA   07/18/22 1453 (!) 112 20 127/84 sitting EOB 92 % RA    07/18/22 1444 98 28 127/84 supine pre-activity 94 %  2L O2 via NC         Current Level of Function Impacting Discharge (mobility/balance): ambulates 20 feet with min A - CGA x 1     Functional Outcome Measure: The patient scored Total Score: 17/28 on the Tinetti outcome measure which is indicative of high fall risk. Other factors to consider for discharge: new L LE/ L sided weakness ? Patient will benefit from skilled therapy intervention to address the above noted impairments. PLAN :  Recommendations and Planned Interventions: bed mobility training, transfer training, gait training, therapeutic exercises, neuromuscular re-education, patient and family training/education, and therapeutic activities      Frequency/Duration: Patient will be followed by physical therapy:  5 times a week to address goals.     Recommendation for discharge: (in order for the patient to meet his/her long term goals)  Physical therapy at least 2 days/week in the home AND ensure assist and/or supervision for safety with mobility    This discharge recommendation:  A follow-up discussion with the attending provider and/or case management is planned    IF patient discharges home will need the following DME: none         SUBJECTIVE:   Patient stated Marsha Harrison my left leg does feel kind of weak and that's weird because they went in through my R groin for that filter.     OBJECTIVE DATA SUMMARY:   HISTORY:    Past Medical History:   Diagnosis Date    Hypertension      Past Surgical History:   Procedure Laterality Date    IR [facility] W SI  7/17/2022    IR [facility] IVC FILTER  7/15/2022       Personal factors and/or comorbidities impacting plan of care:     Home Situation  Home Environment: Private residence  # Steps to Enter: 4  Rails to Enter: Yes  Hand Rails : Left  One/Two Story Residence: Two story  # of Interior Steps: 12  Interior Rails: Left  Living Alone: No  Support Systems: Spouse/Significant Other  Patient Expects to be Discharged to[de-identified] Home with home health  Current DME Used/Available at Home: None    EXAMINATION/PRESENTATION/DECISION MAKING:   Critical Behavior:  Neurologic State: Alert  Orientation Level: Oriented X4  Cognition: Appropriate decision making  Safety/Judgement: Good awareness of safety precautions  Hearing:   Auditory  Auditory Impairment: None  Skin:  intact  Edema: none noted  Range Of Motion:  AROM: Generally decreased, functional           PROM: Within functional limits           Strength:    Strength: Generally decreased, functional (Noted mildly diminished strength LLE compared to RLE)        RLE Strength  R Hip Flexion: 4  R Hip ABduction: 5  R Hip ADduction: 5  R Knee Flexion: 5  R Knee Extension: 5  R Ankle Dorsiflexion: 5  R Ankle Plantar Flexion: 5        LLE Strength  L Hip Flexion: 4-  L Hip ABduction: 4  L Hip ADduction: 5  L Knee Flexion: 4  L Knee Extension: 5  L Ankle Dorsiflexion: 4  L Ankle Plantar Flexion: 4  Tone & Sensation:   Tone: Normal              Sensation: Intact Coordination:  Coordination: Within functional limits  Vision:      Functional Mobility:  Bed Mobility:  Rolling: Contact guard assistance  Supine to Sit: Minimum assistance     Scooting: Contact guard assistance  Transfers:  Sit to Stand: Minimum assistance;Assist x1  Stand to Sit: Contact guard assistance;Assist x1        Bed to Chair: Contact guard assistance;Assist x1              Balance:   Sitting: Intact; Without support  Standing: Impaired; With support  Standing - Static: Good;Constant support  Standing - Dynamic : Fair;Constant support  Ambulation/Gait Training:  Distance (ft): 20 Feet (ft)  Assistive Device: Gait belt (2 HHA to 1 HHA to no HHA)  Ambulation - Level of Assistance: Minimal assistance;Contact guard assistance;Assist x1        Gait Abnormalities: Decreased step clearance; Altered arm swing;Trunk sway increased        Base of Support: Widened  Stance: Weight shift  Speed/Kirsty: Slow  Step Length: Right shortened;Left shortened         Functional Measure:  Tinetti test:    Sitting Balance: 1  Arises: 0  Attempts to Rise: 0  Immediate Standing Balance: 1  Standing Balance: 1  Nudged: 1  Eyes Closed: 1  Turn 360 Degrees - Continuous/Discontinuous: 1  Turn 360 Degrees - Steady/Unsteady: 1  Sitting Down: 1  Balance Score: 8 Balance total score  Indication of Gait: 1  R Step Length/Height: 1  L Step Length/Height: 1  R Foot Clearance: 1  L Foot Clearance: 1  Step Symmetry: 1  Step Continuity: 1  Path: 1  Trunk: 0  Walking Time: 1  Gait Score: 9 Gait total score  Total Score: 17/28 Overall total score         Tinetti Tool Score Risk of Falls  <19 = High Fall Risk  19-24 = Moderate Fall Risk  25-28 = Low Fall Risk  Tinetti ME. Performance-Oriented Assessment of Mobility Problems in Elderly Patients. Tineo 66; [id number].  (Scoring Description: PT Bulletin Feb. 10, 1993)    Older adults: Derrick Duran et al, 2009; n = [address] elderly evaluated with ABC, BALDEV, ADL, and IADL)  · Mean BALDEV score for males aged 65-79 years = 26.21(3.40)  · Mean BALDEV score for females age 69-68 years = 25.16(4.30)  · Mean BALDEV score for males over 80 years = 23.29(6.02)  · Mean BALDEV score for females over [de-identified] years = 17.20(8.32)        Physical Therapy Evaluation Charge Determination   History Examination Presentation Decision-Making   MEDIUM  Complexity : 1-2 comorbidities / personal factors will impact the outcome/ POC  LOW Complexity : 1-2 Standardized tests and measures addressing body structure, function, activity limitation and / or participation in recreation  LOW Complexity : Stable, uncomplicated  Other outcome measures Tinetti  LOW       Based on the above components, the patient evaluation is determined to be of the following complexity level: LOW     Pain Rating:  Up to 7/10 abdominal pain - RN aware    Activity Tolerance:   Fair, SpO2 stable on RA, and limited  by abdominal pain      After treatment patient left in no apparent distress:   Sitting in chair, Call bell within reach, and Caregiver / family present    COMMUNICATION/EDUCATION:   The patients plan of care was discussed with: Registered nurse. Fall prevention education was provided and the patient/caregiver indicated understanding.     Thank you for this referral.  Lia Villanueva, PT   Time Calculation: 49 mins

## 2022-07-18 NOTE — PROGRESS NOTES
No major event this shift. Pt slept most of the night. Bedside shift change report given to Kerin Mortimer, RN (oncoming nurse) by Ivet Mcgarry RN (offgoing nurse). Report included the following information SBAR, Kardex, Recent Results, Cardiac Rhythm NSR, ST and Alarm Parameters .

## 2022-07-18 NOTE — PROGRESS NOTES
Transition of Care Plan  RUR 14%    Splenic embolization 7/17/22    Disposition Home with     [address] [facility]   Will arrange if ordered--therapy recommending home PT  Has [employer] insurance    Medical follow up   PCP and specialist    Contact   [phone]    CM continues to follow for transition of care planning. Therapy recommending Home PT  Cm will talk with patient/ to secure choice once order received.

## 2022-07-18 NOTE — PROGRESS NOTES
Surgery Progress Note    7/18/2022    Admit Date: 7/12/2022    CC: Abd pain    7/17: IR splenic embolization    Subjective:     Abd pain persists. Thirsty. On NC. Wants to go home. Constitutional: No fever or chills  Neurologic: Headache  Eyes: No scleral icterus or irritated eyes  Nose: No nasal pain or drainage  Mouth: No oral lesions or sore throat  Cardiac: No palpations or chest pain  Pulmonary: No cough or shortness of breath  Gastrointestinal: Abd pain, No nausea, emesis, diarrhea, or constipation  Genitourinary: No dysuria  Musculoskeletal: No muscle or joint tenderness  Skin: No rashes or lesions  Psychiatric: No anxiety or depressed mood    Objective:     Visit Vitals  BP (!) 140/90 (BP 1 Location: Left upper arm, BP Patient Position: At rest)   Pulse 100   Temp 98.9 °F (37.2 °C)   Resp 25   Ht 5' 3\" (1.6 m)   Wt 174 lb 2.6 oz (79 kg)   SpO2 94%   BMI 30.85 kg/m²       General: No acute distress, conversant  Eyes: PERRLA, no scleral icterus  HENT: Normocephalic without oral lesions  Neck: Trachea midline without LAD  Cardiac: Normal pulse rate and rhythm  Pulmonary: Symmetric chest rise with normal effort, NC in place  GI: Soft, moderate tenderness in LUQ, ND, no hernia, no splenomegaly  Skin: Warm without rash  Extremities: Right IR site w/o hematoma  Psych: Appropriate mood and affect    Labs, vital signs, and I/O reviewed. Assessment:     73 y/o F s/p splenic injury after colonoscopy with PE s/p IVCF and IR embolization    Plan:     PRN pain control. Added PO narcotics  Slight Cr bump. Per primary team  PE- IVCF in place. Wean oxygen  Clears okay  Hgb drifting down. Two units yesterday. Hold one unit. Check again around 3PM today. HDS  Ambulate  Okay to get OOB to chair and walk  Suspect in hospital a few more days. S/p splenic artery embolization-Triple vac ordered.     Chelsey Booker MD  Bariatric and General Surgeon  [facility]

## 2022-07-18 NOTE — PROGRESS NOTES
Problem: Falls - Risk of  Goal: *Absence of Falls  Description: Document Eden Walsh Fall Risk and appropriate interventions in the flowsheet.   Outcome: Progressing Towards Goal  Note: Fall Risk Interventions:  Mobility Interventions: Assess mobility with egress test,Patient to call before getting OOB         Medication Interventions: Evaluate medications/consider consulting pharmacy    Elimination Interventions: Call light in reach    History of Falls Interventions: Consult care management for discharge planning,Door open when patient unattended         Problem: Patient Education: Go to Patient Education Activity  Goal: Patient/Family Education  Outcome: Progressing Towards Goal     Problem: Pain  Goal: *Control of Pain  Outcome: Progressing Towards Goal  Goal: *PALLIATIVE CARE:  Alleviation of Pain  Outcome: Progressing Towards Goal

## 2022-07-19 LAB
ANION GAP SERPL CALC-SCNC: 5 MMOL/L (ref 5–15)
BASOPHILS # BLD: 0 K/UL (ref 0–0.1)
BASOPHILS NFR BLD: 0 % (ref 0–1)
BUN SERPL-MCNC: 16 MG/DL (ref 6–20)
BUN/CREAT SERPL: 31 (ref 12–20)
CALCIUM SERPL-MCNC: 7.9 MG/DL (ref 8.5–10.1)
CHLORIDE SERPL-SCNC: 107 MMOL/L (ref 97–108)
CO2 SERPL-SCNC: 25 MMOL/L (ref 21–32)
CREAT SERPL-MCNC: 0.51 MG/DL (ref 0.55–1.02)
DIFFERENTIAL METHOD BLD: ABNORMAL
EOSINOPHIL # BLD: 0.1 K/UL (ref 0–0.4)
EOSINOPHIL NFR BLD: 1 % (ref 0–7)
ERYTHROCYTE [DISTWIDTH] IN BLOOD BY AUTOMATED COUNT: 21.2 % (ref 11.5–14.5)
GLUCOSE SERPL-MCNC: 103 MG/DL (ref 65–100)
HCT VFR BLD AUTO: 21.3 % (ref 35–47)
HGB BLD-MCNC: 7.1 G/DL (ref 11.5–16)
HGB BLD-MCNC: 7.8 G/DL (ref 11.5–16)
HISTORY CHECKED?,CKHIST: NORMAL
IMM GRANULOCYTES # BLD AUTO: 0.1 K/UL (ref 0–0.04)
IMM GRANULOCYTES NFR BLD AUTO: 1 % (ref 0–0.5)
LYMPHOCYTES # BLD: 1.1 K/UL (ref 0.8–3.5)
LYMPHOCYTES NFR BLD: 9 % (ref 12–49)
MCH RBC QN AUTO: 30.7 PG (ref 26–34)
MCHC RBC AUTO-ENTMCNC: 33.3 G/DL (ref 30–36.5)
MCV RBC AUTO: 92.2 FL (ref 80–99)
MONOCYTES # BLD: 1.2 K/UL (ref 0–1)
MONOCYTES NFR BLD: 10 % (ref 5–13)
NEUTS SEG # BLD: 9.7 K/UL (ref 1.8–8)
NEUTS SEG NFR BLD: 79 % (ref 32–75)
NRBC # BLD: 0.1 K/UL (ref 0–0.01)
NRBC BLD-RTO: 0.8 PER 100 WBC
PLATELET # BLD AUTO: 172 K/UL (ref 150–400)
PMV BLD AUTO: 10.1 FL (ref 8.9–12.9)
POTASSIUM SERPL-SCNC: 3.9 MMOL/L (ref 3.5–5.1)
RBC # BLD AUTO: 2.31 M/UL (ref 3.8–5.2)
RBC MORPH BLD: ABNORMAL
SODIUM SERPL-SCNC: 137 MMOL/L (ref 136–145)
WBC # BLD AUTO: 12.2 K/UL (ref 3.6–11)

## 2022-07-19 PROCEDURE — 74011250636 HC RX REV CODE- 250/636: Performed by: SURGERY

## 2022-07-19 PROCEDURE — 74011250637 HC RX REV CODE- 250/637: Performed by: FAMILY MEDICINE

## 2022-07-19 PROCEDURE — 74011000250 HC RX REV CODE- 250: Performed by: FAMILY MEDICINE

## 2022-07-19 PROCEDURE — 97116 GAIT TRAINING THERAPY: CPT

## 2022-07-19 PROCEDURE — 97535 SELF CARE MNGMENT TRAINING: CPT

## 2022-07-19 PROCEDURE — 85025 COMPLETE CBC W/AUTO DIFF WBC: CPT

## 2022-07-19 PROCEDURE — 65660000001 HC RM ICU INTERMED STEPDOWN

## 2022-07-19 PROCEDURE — 74011000636 HC RX REV CODE- 636: Performed by: SURGERY

## 2022-07-19 PROCEDURE — 90732 PPSV23 VACC 2 YRS+ SUBQ/IM: CPT | Performed by: SURGERY

## 2022-07-19 PROCEDURE — 36415 COLL VENOUS BLD VENIPUNCTURE: CPT

## 2022-07-19 PROCEDURE — 90647 HIB PRP-OMP VACC 3 DOSE IM: CPT | Performed by: SURGERY

## 2022-07-19 PROCEDURE — 74011250637 HC RX REV CODE- 250/637: Performed by: SURGERY

## 2022-07-19 PROCEDURE — 97165 OT EVAL LOW COMPLEX 30 MIN: CPT

## 2022-07-19 PROCEDURE — 85018 HEMOGLOBIN: CPT

## 2022-07-19 PROCEDURE — 80048 BASIC METABOLIC PNL TOTAL CA: CPT

## 2022-07-19 PROCEDURE — 99232 SBSQ HOSP IP/OBS MODERATE 35: CPT | Performed by: SURGERY

## 2022-07-19 PROCEDURE — 90471 IMMUNIZATION ADMIN: CPT

## 2022-07-19 PROCEDURE — 74011250637 HC RX REV CODE- 250/637: Performed by: STUDENT IN AN ORGANIZED HEALTH CARE EDUCATION/TRAINING PROGRAM

## 2022-07-19 PROCEDURE — 97530 THERAPEUTIC ACTIVITIES: CPT

## 2022-07-19 RX ORDER — SODIUM CHLORIDE 9 MG/ML
250 INJECTION, SOLUTION INTRAVENOUS AS NEEDED
Status: DISCONTINUED | OUTPATIENT
Start: 2022-07-19 | End: 2022-07-20 | Stop reason: SDUPTHER

## 2022-07-19 RX ORDER — POLYETHYLENE GLYCOL 3350 17 G/17G
17 POWDER, FOR SOLUTION ORAL DAILY
Status: DISCONTINUED | OUTPATIENT
Start: 2022-07-19 | End: 2022-07-22 | Stop reason: HOSPADM

## 2022-07-19 RX ADMIN — Medication 3 MG: at 22:13

## 2022-07-19 RX ADMIN — HYDROMORPHONE HYDROCHLORIDE 1 MG: 1 INJECTION, SOLUTION INTRAMUSCULAR; INTRAVENOUS; SUBCUTANEOUS at 22:13

## 2022-07-19 RX ADMIN — SODIUM CHLORIDE, PRESERVATIVE FREE 10 ML: 5 INJECTION INTRAVENOUS at 13:08

## 2022-07-19 RX ADMIN — HAEMOPHILUS B CONJUGATE VACCINE (MENINGOCOCCAL PROTEIN CONJUGATE) 7.5 MCG: 7.5 INJECTION, SUSPENSION INTRAMUSCULAR at 09:36

## 2022-07-19 RX ADMIN — POLYETHYLENE GLYCOL 3350 17 G: 17 POWDER, FOR SOLUTION ORAL at 09:01

## 2022-07-19 RX ADMIN — HYDROMORPHONE HYDROCHLORIDE 1 MG: 1 INJECTION, SOLUTION INTRAMUSCULAR; INTRAVENOUS; SUBCUTANEOUS at 15:27

## 2022-07-19 RX ADMIN — OXYCODONE AND ACETAMINOPHEN 1 TABLET: 5; 325 TABLET ORAL at 13:07

## 2022-07-19 RX ADMIN — SODIUM CHLORIDE, PRESERVATIVE FREE 10 ML: 5 INJECTION INTRAVENOUS at 05:11

## 2022-07-19 RX ADMIN — HYDROMORPHONE HYDROCHLORIDE 1 MG: 1 INJECTION, SOLUTION INTRAMUSCULAR; INTRAVENOUS; SUBCUTANEOUS at 10:35

## 2022-07-19 RX ADMIN — OXYCODONE AND ACETAMINOPHEN 1 TABLET: 5; 325 TABLET ORAL at 19:14

## 2022-07-19 RX ADMIN — OXYCODONE AND ACETAMINOPHEN 1 TABLET: 5; 325 TABLET ORAL at 09:01

## 2022-07-19 RX ADMIN — PNEUMOCOCCAL VACCINE POLYVALENT 0.5 ML
25; 25; 25; 25; 25; 25; 25; 25; 25; 25; 25; 25; 25; 25; 25; 25; 25; 25; 25; 25; 25; 25; 25 INJECTION, SOLUTION INTRAMUSCULAR; SUBCUTANEOUS at 10:32

## 2022-07-19 RX ADMIN — SODIUM CHLORIDE, PRESERVATIVE FREE 10 ML: 5 INJECTION INTRAVENOUS at 22:13

## 2022-07-19 RX ADMIN — ACETAMINOPHEN 650 MG: 325 TABLET ORAL at 05:11

## 2022-07-19 NOTE — PROGRESS NOTES
Rounded on Taoism patients and provided Anointing of the Sick at request of patient.     Debbi Faria

## 2022-07-19 NOTE — PROGRESS NOTES
Problem: Mobility Impaired (Adult and Pediatric)  Goal: *Acute Goals and Plan of Care (Insert Text)  Description: FUNCTIONAL STATUS PRIOR TO ADMISSION: Patient was independent and active without use of DME.    HOME SUPPORT PRIOR TO ADMISSION: The patient lived with spouse but did not require assist.    Physical Therapy Goals  Initiated 7/18/2022  1. Patient will move from supine to sit and sit to supine  in bed with independence within 7 day(s). 2.  Patient will transfer from bed to chair and chair to bed with independence using the least restrictive device within 7 day(s). 3.  Patient will perform sit to stand with independence within 7 day(s). 4.  Patient will ambulate with independence for 100 feet with the least restrictive device within 7 day(s). 5.  Patient will ascend/descend 16 stairs with one handrail(s) with independence within 7 day(s). Outcome: Progressing Towards Goal   PHYSICAL THERAPY TREATMENT  Patient: Linda Armstrong (81 y.o. female)  Date: 7/19/2022  Diagnosis: Splenic laceration [S36.039A] <principal problem not specified>       Precautions: Fall  Chart, physical therapy assessment, plan of care and goals were reviewed. ASSESSMENT  Patient continues with skilled PT services and is progressing towards goals. Patient endorses feeling better today than yesterday but somewhat disheartened about possibility of needing another abdominal surgery to address splenic laceration. Patient noted to have HgB of 6.1 yesterday but now 7.1 this AM following 1 unit RBCs. Patient completed transfers with CGA and ambulated up to 100 feet with intermittent standing rest breaks with CGA and intermittent use of hallway railing for support. One instance of L knee buckling noted requiring [place]  support. Patient motivated to sit up for most of today out of bed in chair and left in chair with call bell in reach and all needs met.  Patient initially on 1.5 L O2 via NC with 97% sat at rest, and 96% sat on RA post-activity, therefore patient left on RA. Continued recommendation for [facility] PT at discharge for continued rehabilitation. Current Level of Function Impacting Discharge (mobility/balance): ambulates 100 feet with CGA-min A x 1     Other factors to consider for discharge: anemia          PLAN :  Patient continues to benefit from skilled intervention to address the above impairments. Continue treatment per established plan of care. to address goals. Recommendation for discharge: (in order for the patient to meet his/her long term goals)  Physical therapy at least 2 days/week in the home     This discharge recommendation:  A follow-up discussion with the attending provider and/or case management is planned    IF patient discharges home will need the following DME: none       SUBJECTIVE:   Patient stated I just really don't want to have to have another surgery.     OBJECTIVE DATA SUMMARY:   Critical Behavior:  Neurologic State: Alert  Orientation Level: Oriented X4  Cognition: Appropriate decision making,Appropriate for age attention/concentration,Appropriate safety awareness,Follows commands  Safety/Judgement: Good awareness of safety precautions  Functional Mobility Training:  Bed Mobility:                    Transfers:  Sit to Stand: Contact guard assistance;Assist x1  Stand to Sit: Contact guard assistance;Assist x1        Bed to Chair: Contact guard assistance;Assist x1                    Balance:  Sitting: Intact; Without support  Standing: Impaired; With support  Standing - Static: Good;Constant support  Standing - Dynamic : Good;Fair;Constant support  Ambulation/Gait Training:  Distance (ft): 100 Feet (ft)  Assistive Device: Gait belt (Used hallway rail intermittently)  Ambulation - Level of Assistance: Contact guard assistance;Minimal assistance;Assist x1        Gait Abnormalities: Decreased step clearance; Altered arm swing;Trunk sway increased        Base of Support: Widened  Stance: Weight shift  Speed/Kirsty: Slow  Step Length: Left shortened;Right shortened                Pain Ratin/10 L foot pain reported with initial weightbearing but improved with further ambulation - RN aware     Activity Tolerance:   Good, SpO2 stable on RA, and requires rest breaks    After treatment patient left in no apparent distress:   Sitting in chair and Call bell within reach    COMMUNICATION/COLLABORATION:   The patients plan of care was discussed with: Occupational therapist and Registered nurse.      Ross Mclean, PT   Time Calculation: 41 mins

## 2022-07-19 NOTE — PROGRESS NOTES
Surgery Progress Note    2022    Admit Date: 2022    CC: Abd pain    : IR splenic embolization    Subjective:     Frustrated with care. Only got one of the vaccinations yesterday. She reports some  in nurses pocket? Passing gas. Painful. Feels like she isn't being cared for. Constitutional: No fever or chills  Neurologic: Headache  Eyes: No scleral icterus or irritated eyes  Nose: No nasal pain or drainage  Mouth: No oral lesions or sore throat  Cardiac: No palpations or chest pain  Pulmonary: No cough or shortness of breath  Gastrointestinal: Abd pain, No nausea, emesis, diarrhea, or constipation  Genitourinary: No dysuria  Musculoskeletal: No muscle or joint tenderness  Skin: No rashes or lesions  Psychiatric: No anxiety or depressed mood    Objective:     Visit Vitals  /78 (BP 1 Location: Right upper arm, BP Patient Position: At rest)   Pulse (!) 8   Temp 99.2 °F (37.3 °C)   Resp 24   Ht 5' 3\" (1.6 m)   Wt 174 lb 2.6 oz (79 kg)   SpO2 97%   BMI 30.85 kg/m²       General: No acute distress, conversant  Eyes: PERRLA, no scleral icterus  HENT: Normocephalic without oral lesions  Neck: Trachea midline without LAD  Cardiac: Normal pulse rate and rhythm  Pulmonary: Symmetric chest rise with normal effort, NC in place  GI: Soft, moderate tenderness in LUQ, ND, no hernia, no splenomegaly  Skin: Warm without rash  Extremities: Right IR site w/o hematoma  Psych: Appropriate mood and affect    Labs, vital signs, and I/O reviewed. Assessment:     75 y/o F s/p splenic injury after colonoscopy with PE s/p IVCF and IR embolization with need for blood last night    Plan:     PRN pain control. Encouraged her to take the narcotics PRN. Cr normal this AM. Good UOP  PE- IVCF in place. Wean oxygen  Okay for reg diet. Passing gas. Miralax ordered  OOB to chair and walk  Suspect in hospital a few more days. S/p splenic artery embolization-Triple vac-only one given.  Other two ordered again today.    Trudi Gordon 2/2 splenic injury and IR embolization. Blood last night. Appropriate rise last night of 1 gram. HDS and making urine and Cr normalized. Check hgb around 2 PM. May need another unit being close to 7 but think we can hold off on further IR or imaging for now. I will decrease IVF to decrease any dilutional impacts.     Kaylah Albarran MD  Bariatric and General Surgeon  [facility]

## 2022-07-19 NOTE — PROGRESS NOTES
[facility]  Hospitalist Group                                                                                          Hospitalist Progress Note  Chance Dunn MD  Answering service: [phone] OR [phone] from in house phone        Date of Service:  2022  NAME:  Peter Perry  :  1955  MRN:  [id number]      Admission Summary:   HPI: Navarro Aly is a 74 y.o. female who presents with abdominal pain     Patient had colonoscopy done yesterday, started experiencing left-sided abdominal pain with left shoulder pain, symptoms started around 3 PM, immediately after waking up from colonoscopy, patient continued to have nausea associated with abdominal pain, she took some Tylenol at home, symptoms did not jasper, called her GI doctor and came to the ER, patient came to [facility], had imaging done and was found to have splenic laceration and was requested to be admitted to the hospitalist service, denies any other complaints or problems\"       Interval history / Subjective:   Seen examined at bedside.  Feels well, resting in bed, still complains of pain in back b/l Hgb 7.8 this afternoon better post transfusion      Assessment & Plan:     Splenic Lac > peritoneal hemorrhage with thrombus of the spleen  Acute blood loss anemia  Acute PE LLL  Hypocalcemia   -rapid called  for hypotension, bolused IV fluids stat CTA of the abdomen pelvis confirmed increased peritoneal hemorrhage with thrombus surrounding the spleen  -Post IR embolization splenic artery    -cont to monitor H&H q 8  -s/p 4 units prbc for hospital course  -CTA chest confirmed LLL PE   -Discussed risk versus benefits with patient at this point in time increased risk for bleed from splenic lac outweights benefit of ac for PE   -placed IVC filter 7/15  -Continue to trend H&H's, transfuse for hemoglobin less than 7  -ca improved s/p supplementation continue to monitor  -follow cultures > bld cx ngtd   -advance diet per surgery/IR  -vaccinated for splenic orgs 7/19  -hold off on 2nd embolization for now, mangement per IR/surgery      Work with PT/OT, mobilize as tolerated        Code status: full   Prophylaxis: [address] discussed with: patient/family, nurse  Anticipated Disposition: >48 hrs      Hospital Problems  Never Reviewed          Codes Class Noted POA    Splenic laceration ICD-10-CM: D19.977H  ICD-9-CM: 865.09  7/12/2022 Unknown                Review of Systems:   A comprehensive review of systems was negative except for that written in the HPI. Vital Signs:    Last 24hrs VS reviewed since prior progress note. Most recent are:  Visit Vitals  BP (!) 143/88   Pulse 90   Temp 99.2 °F (37.3 °C)   Resp 19   Ht 5' 3\" (1.6 m)   Wt 79 kg (174 lb 2.6 oz)   SpO2 100%   BMI 30.85 kg/m²         Intake/Output Summary (Last 24 hours) at 7/19/2022 1454  Last data filed at 7/19/2022 0231  Gross per 24 hour   Intake 620 ml   Output 900 ml   Net -280 ml        Physical Examination:     I had a face to face encounter with this patient and independently examined them on 7/19/2022 as outlined below:          Constitutional:  No acute distress, cooperative, pleasant    ENT:  Oral mucosa moist, oropharynx benign. Resp:  CTA bilaterally. No wheezing/rhonchi/rales. No accessory muscle use. CV:  Regular rhythm, normal rate, no murmurs, gallops, rubs    GI:  Soft, non distended, non tender. normoactive bowel sounds, no hepatosplenomegaly     Musculoskeletal:  No edema, warm, 2+ pulses throughout    Neurologic:  Moves all extremities.   AAOx3, CN II-XII reviewed            Data Review:    Review and/or order of clinical lab test  Review and/or order of tests in the radiology section of CPT  Review and/or order of tests in the medicine section of CPT      Labs:     Recent Labs     07/19/22  1400 07/19/22  0604 07/18/22  1637 07/18/22  0400   WBC  --  12.2*  --  14.2*   HGB 7.8* 7.1*   < > 7.2*   HCT  --  21.3*  --  21.5*   PLT  -- 172  --  173    < > = values in this interval not displayed. Recent Labs     07/19/22  0604 07/18/22  0400 07/17/22  0544 07/16/22  1950 07/16/22  1950    140 137   < > 137   K 3.9 4.5 4.8   < > 4.0    110* 108   < > 107   CO2 25 23 22   < > 24   BUN 16 31* 16   < > 9   CREA 0.51* 1.13* 1.00   < > 0.78   * 125* 140*   < > 155*   CA 7.9* 8.3* 7.9*   < > 8.6   MG  --   --   --   --  1.9    < > = values in this interval not displayed. No results for input(s): ALT, AP, TBIL, TBILI, TP, ALB, GLOB, GGT, AML, LPSE in the last 72 hours. No lab exists for component: SGOT, GPT, AMYP, HLPSE  No results for input(s): INR, PTP, APTT, INREXT, INREXT in the last 72 hours. No results for input(s): FE, TIBC, PSAT, FERR in the last 72 hours. No results found for: FOL, RBCF   No results for input(s): PH, PCO2, PO2 in the last 72 hours. No results for input(s): CPK, CKNDX, TROIQ in the last 72 hours.     No lab exists for component: CPKMB  No results found for: CHOL, CHOLX, CHLST, CHOLV, HDL, HDLP, LDL, LDLC, DLDLP, TGLX, TRIGL, TRIGP, CHHD, CHHDX  No results found for: GLUCPOC  No results found for: COLOR, APPRN, SPGRU, REFSG, FELY, PROTU, GLUCU, KETU, BILU, UROU, KAITLIN, LEUKU, GLUKE, EPSU, BACTU, WBCU, RBCU, CASTS, UCRY      Medications Reviewed:     Current Facility-Administered Medications   Medication Dose Route Frequency    polyethylene glycol (MIRALAX) packet 17 g  17 g Oral DAILY    0.9% sodium chloride infusion 250 mL  250 mL IntraVENous PRN    HYDROmorphone (DILAUDID) injection 1 mg  1 mg IntraVENous Q2H PRN    oxyCODONE-acetaminophen (PERCOCET 10)  mg per tablet 1 Tablet  1 Tablet Oral Q4H PRN    oxyCODONE-acetaminophen (PERCOCET) 5-325 mg per tablet 1 Tablet  1 Tablet Oral Q4H PRN    0.9% sodium chloride infusion  25 mL/hr IntraVENous CONTINUOUS    alum-mag hydroxide-simeth (MYLANTA) oral suspension 30 mL  30 mL Oral Q4H PRN    melatonin tablet 3 mg  3 mg Oral QHS    butalbital-acetaminophen-caffeine (FIORICET, ESGIC) -40 mg per tablet 1 Tablet  1 Tablet Oral Q4H PRN    nicotine (NICODERM CQ) 21 mg/24 hr patch 1 Patch  1 Patch TransDERmal DAILY    ondansetron (ZOFRAN) injection 4 mg  4 mg IntraVENous Q4H PRN    sodium chloride (NS) flush 5-40 mL  5-40 mL IntraVENous Q8H    sodium chloride (NS) flush 5-40 mL  5-40 mL IntraVENous PRN    acetaminophen (TYLENOL) tablet 650 mg  650 mg Oral Q4H PRN     ______________________________________________________________________  EXPECTED LENGTH OF STAY: 4d 7h  ACTUAL LENGTH OF STAY:          6                 Aniket Howell MD

## 2022-07-19 NOTE — PROGRESS NOTES
Hemoglobin up from earlier check. Remains HDS. We will stop serial checks. Check a.m. hemoglobin and then stop.     Daniel Chow MD  Bariatric and General Surgeon  [facility]

## 2022-07-19 NOTE — PROGRESS NOTES
Problem: Self Care Deficits Care Plan (Adult)  Goal: *Acute Goals and Plan of Care (Insert Text)  Description: FUNCTIONAL STATUS PRIOR TO ADMISSION: Patient was independent and active without use of DME. Pt [place]. HOME SUPPORT: The patient lived with  but did not require assist.    Occupational Therapy Goals  Initiated 7/19/2022  1. Patient will perform standing ADLs at sink with modified independence within 7 day(s). 2.  Patient will perform lower body dressing with modified independence within 7 day(s). 3.  Patient will perform bathing with modified independence within 7 day(s). 4.  Patient will perform toilet transfers with modified independence within 7 day(s). 5.  Patient will perform all aspects of toileting with modified independence within 7 day(s). Outcome: Progressing Towards Goal       OCCUPATIONAL THERAPY EVALUATION  Patient: Smita Cassidy (35 y.o. female)  Date: 7/19/2022  Primary Diagnosis: Splenic laceration [S36.039A]        Precautions:   Fall    ASSESSMENT  Based on the objective data described below, the patient presents with abdominal pain, generalized weakness, decreased activity tolerance, impaired functional reach to feet and decline in functional status s/p admission for splenic laceration after colonoscopy resulting in anemia. Pt also with LLL PE, now s/p splenic embolization and IVC filter placement Pt required CGA for rolling and supine to sit with pt tolerating sitting EOB x 20 minutes to participate in CHG bathing, grooming and LB ADLs. Completed sit<>stand with CGA and min A to transfer to chair without use of AD. Encouraged to sit up in chair for meals and pt in agreement. At this time, pt is below her independent baseline, needing up to mod A for LB ADLs 2* abdominal discomfort. Recommend HHOT/PT at discharge.      Current Level of Function Impacting Discharge (ADLs/self-care): CGA to min A for balance without AD, mod A LB    Functional Outcome Measure: The patient scored Total: 50/100 on the Barthel Index outcome measure   Other factors to consider for discharge: from home     Patient will benefit from skilled therapy intervention to address the above noted impairments. PLAN :  Recommendations and Planned Interventions: self care training, functional mobility training, therapeutic exercise, balance training, therapeutic activities, endurance activities, patient education, and home safety training    Frequency/Duration: Patient will be followed by occupational therapy 5 times a week to address goals. Recommendation for discharge: (in order for the patient to meet his/her long term goals)  Occupational therapy at least 2 days/week in the home     This discharge recommendation:  Has been made in collaboration with the attending provider and/or case management    IF patient discharges home will need the following DME: none       SUBJECTIVE:   Patient stated I shower everyday.     OBJECTIVE DATA SUMMARY:   HISTORY:   Past Medical History:   Diagnosis Date    Hypertension      Past Surgical History:   Procedure Laterality Date    IR [facility] W SI  7/17/2022    IR [facility] IVC FILTER  7/15/2022       Expanded or extensive additional review of patient history:     Home Situation  Home Environment: Private residence  # Steps to Enter: 4  Rails to Enter: Yes  Hand Rails : Left  One/Two Story Residence: Two story  # of Interior Steps: 12  Interior Rails: Left  Living Alone: No  Support Systems: Spouse/Significant Other  Patient Expects to be Discharged to[de-identified] Home with home health  Current DME Used/Available at Home: None    Hand dominance: Right    EXAMINATION OF PERFORMANCE DEFICITS:  Cognitive/Behavioral Status:  Neurologic State: Alert  Orientation Level: Oriented X4  Cognition: Appropriate decision making; Appropriate for age attention/concentration; Appropriate safety awareness; Follows commands  Perception: Appears intact  Perseveration: No perseveration noted  Safety/Judgement: Awareness of environment    Skin: intact    Edema: distal LEs    Hearing: Auditory  Auditory Impairment: None    Vision/Perceptual:                           Acuity: Within Defined Limits         Range of Motion:  Generally decreased but functional, c/o L LE pain and weakness                            Strength:  Generally decreased, functional, pain L UE                   Coordination:     Fine Motor Skills-Upper: Left Intact; Right Intact    Gross Motor Skills-Upper: Left Intact; Right Intact    Tone & Sensation:  Intact to light touch                             Balance:  Sitting: Intact; Without support  Standing: Impaired; With support  Standing - Static: Good;Constant support  Standing - Dynamic : Good;Fair;Constant support    Functional Mobility and Transfers for ADLs:  Bed Mobility:   Supine to sit and rolling: verbal cues and CGA    Transfers:  Sit to Stand: Contact guard assistance;Assist x1  Stand to Sit: Contact guard assistance;Assist x1  Bed to Chair: Contact guard assistance;Assist x1  Toilet Transfer : Contact guard assistance (inferred by transfer to chair)    ADL Assessment:  Feeding: Independent    Oral Facial Hygiene/Grooming: Setup    Bathing: Minimum assistance    Type of Bath: Chlorhexidine (CHG)CHG bath    Upper Body Dressing: Setup    Lower Body Dressing: Moderate assistance    Toileting: Minimum assistance                ADL Intervention and task modifications:       Grooming  Washing Face: Set-up    Upper Body Bathing  Bathing Assistance: Minimum assistance  Position Performed: Seated edge of bed    Type of Bath: Chlorhexidine (CHG)    Lower Body Bathing  Perineal  : Contact guard assistance  Position Performed: Standing  Adaptive Equipment: Walker  Lower Body :  Moderate assistance  Position Performed: Seated edge of bed    Upper [address]: Supervision    Lower Body Dressing Assistance  Socks: Moderate assistance  Leg Crossed Method Used: Yes  Position Performed: Seated edge of bed    Toileting  Bladder Hygiene: Contact guard assistance  Adaptive Equipment: Walker    Cognitive Retraining  Safety/Judgement: Awareness of environment    Therapeutic Exercise:     Functional Measure:    Barthel Index:  Bathin  Bladder: 5  Bowels: 10  Groomin  Dressin  Feeding: 10  Mobility: 0  Stairs: 0  Toilet Use: 5  Transfer (Bed to Chair and Back): 10  Total: 50/100      The Barthel ADL Index: Guidelines  1. The index should be used as a record of what a patient does, not as a record of what a patient could do. 2. The main aim is to establish degree of independence from any help, physical or verbal, however minor and for whatever reason. 3. The need for supervision renders the patient not independent. 4. A patient's performance should be established using the best available evidence. Asking the patient, friends/relatives and nurses are the usual sources, but direct observation and common sense are also important. However direct testing is not needed. 5. Usually the patient's performance over the preceding 24-48 hours is important, but occasionally longer periods will be relevant. 6. Middle categories imply that the patient supplies over 50 per cent of the effort. 7. Use of aids to be independent is allowed. Score Interpretation (from [address] [address])    Independent   60-79 Minimally independent   40-59 Partially dependent   20-39 Very dependent   <20 Totally dependent     -Anival Aviles., Barthel, D.W. (1965). Functional evaluation: the Barthel Index. [address] [facility] ([address] (). The Barthel activities of daily living index: self-reporting versus actual performance in the old (> or = 75 years). Journal of [address] [place] [address] 45(7), [facility], [facility] Hospital[facility]., Pako Hinton., [facility]. (1999).  Measuring the change in disability after inpatient rehabilitation; comparison of the responsiveness of the Barthel Index and Functional [place] Measure. Journal of Neurology, Neurosurgery, and Psychiatry, 66(4), 671-823. BAIRON De La RosaA, MINDY Gauthier, & Godfrey Luciano M.A. (2004) Assessment of post-stroke quality of life in cost-effectiveness studies: The usefulness of the Barthel Index and the EuroQoL-5D. Quality of Life Research, 15, 676-62     Occupational Therapy Evaluation Charge Determination   History Examination Decision-Making   LOW Complexity : Brief history review  LOW Complexity : 1-3 performance deficits relating to physical, cognitive , or psychosocial skils that result in activity limitations and / or participation restrictions  LOW Complexity : No comorbidities that affect functional and no verbal or physical assistance needed to complete eval tasks       Based on the above components, the patient evaluation is determined to be of the following complexity level: LOW   Pain Rating:  Pain L UE/LE    Activity Tolerance:   Good    After treatment patient left in no apparent distress:    Sitting in chair and Call bell within reach    COMMUNICATION/EDUCATION:   The patients plan of care was discussed with: Physical therapist and Registered nurse. Patient/family have participated as able in goal setting and plan of care. This patients plan of care is appropriate for delegation to [facility].     Thank you for this referral.  Floyd Mancilla, OT

## 2022-07-19 NOTE — PROGRESS NOTES
Problem: Falls - Risk of  Goal: *Absence of Falls  Description: Document Keny Hyde Fall Risk and appropriate interventions in the flowsheet.   Outcome: Progressing Towards Goal  Note: Fall Risk Interventions:  Mobility Interventions: Patient to call before getting OOB         Medication Interventions: Assess postural VS orthostatic hypotension,Evaluate medications/consider consulting pharmacy    Elimination Interventions: Call light in reach,Patient to call for help with toileting needs    History of Falls Interventions: Bed/chair exit alarm,Evaluate medications/consider consulting pharmacy         Problem: Patient Education: Go to Patient Education Activity  Goal: Patient/Family Education  Outcome: Progressing Towards Goal     Problem: Pain  Goal: *Control of Pain  Outcome: Progressing Towards Goal  Goal: *PALLIATIVE CARE:  Alleviation of Pain  Outcome: Progressing Towards Goal     Problem: Patient Education: Go to Patient Education Activity  Goal: Patient/Family Education  Outcome: Progressing Towards Goal

## 2022-07-19 NOTE — PROGRESS NOTES
Physical Therapy Note:     Chart reviewed in preparation for PT treatment. Noted patient with HgB of 6.1 yesterday afternoon and order from MD for transfusion. Will defer PT for now pending HgB reassessment after transfusion and will continue to follow for PT treatment as appropriate.      Thank you,    Kirsty Helm, PT, DPT

## 2022-07-19 NOTE — PROGRESS NOTES
Patient with large perisplenic hematoma, worsening on interval scans. S/p gelfoam embolization splenic artery. Unfortunately patient has had continuous drop in Hgb. Discussed with Dr. Sarah Mendieta this am, I'd be happy to offer repeat procedure with proximal splenic artery embolization. He will let me know depending on how patient does today. Thank you for involving IR with this patient's care. Andreina Jeffery M.D.   [address] [facility]

## 2022-07-19 NOTE — PROGRESS NOTES
Pt is on 2l NC. A/O x 4. Pt has 1 unit blood transfusion which she tolerate well with not adverse reaction. Pt is resting now. Labs drawn and sent  Bedside shift change report given to Radha Lambert (oncoming nurse) by Daniel Lagunas (offgoing nurse). Report included the following information SBAR, Kardex, Med Rec Status, Cardiac Rhythm NSR, ST and Alarm Parameters .

## 2022-07-19 NOTE — PROGRESS NOTES
Transition Plan of Care  RUR 11%-Med  Disposition-recommendations are for home health. Remains hemodynamically unstable. Continues to require transfusions. Pending progress with therapy, will likely discharge home with home health.

## 2022-07-20 LAB
ABO + RH BLD: NORMAL
ANION GAP SERPL CALC-SCNC: 6 MMOL/L (ref 5–15)
BASOPHILS # BLD: 0 K/UL (ref 0–0.1)
BASOPHILS NFR BLD: 0 % (ref 0–1)
BLD PROD TYP BPU: NORMAL
BLOOD GROUP ANTIBODIES SERPL: NORMAL
BPU ID: NORMAL
BUN SERPL-MCNC: 12 MG/DL (ref 6–20)
BUN/CREAT SERPL: 26 (ref 12–20)
CALCIUM SERPL-MCNC: 7.9 MG/DL (ref 8.5–10.1)
CHLORIDE SERPL-SCNC: 105 MMOL/L (ref 97–108)
CO2 SERPL-SCNC: 23 MMOL/L (ref 21–32)
CREAT SERPL-MCNC: 0.47 MG/DL (ref 0.55–1.02)
CROSSMATCH RESULT,%XM: NORMAL
DIFFERENTIAL METHOD BLD: ABNORMAL
EOSINOPHIL # BLD: 0.2 K/UL (ref 0–0.4)
EOSINOPHIL NFR BLD: 2 % (ref 0–7)
ERYTHROCYTE [DISTWIDTH] IN BLOOD BY AUTOMATED COUNT: 21.1 % (ref 11.5–14.5)
GLUCOSE SERPL-MCNC: 106 MG/DL (ref 65–100)
HCT VFR BLD AUTO: 21 % (ref 35–47)
HGB BLD-MCNC: 7 G/DL (ref 11.5–16)
HISTORY CHECKED?,CKHIST: NORMAL
IMM GRANULOCYTES # BLD AUTO: 0.1 K/UL (ref 0–0.04)
IMM GRANULOCYTES NFR BLD AUTO: 1 % (ref 0–0.5)
LYMPHOCYTES # BLD: 1 K/UL (ref 0.8–3.5)
LYMPHOCYTES NFR BLD: 9 % (ref 12–49)
MCH RBC QN AUTO: 31 PG (ref 26–34)
MCHC RBC AUTO-ENTMCNC: 33.3 G/DL (ref 30–36.5)
MCV RBC AUTO: 92.9 FL (ref 80–99)
MONOCYTES # BLD: 1 K/UL (ref 0–1)
MONOCYTES NFR BLD: 9 % (ref 5–13)
NEUTS SEG # BLD: 9.1 K/UL (ref 1.8–8)
NEUTS SEG NFR BLD: 79 % (ref 32–75)
NRBC # BLD: 0.04 K/UL (ref 0–0.01)
NRBC BLD-RTO: 0.3 PER 100 WBC
PLATELET # BLD AUTO: 215 K/UL (ref 150–400)
PMV BLD AUTO: 9.5 FL (ref 8.9–12.9)
POTASSIUM SERPL-SCNC: 3.8 MMOL/L (ref 3.5–5.1)
RBC # BLD AUTO: 2.26 M/UL (ref 3.8–5.2)
RBC MORPH BLD: ABNORMAL
SODIUM SERPL-SCNC: 134 MMOL/L (ref 136–145)
SPECIMEN EXP DATE BLD: NORMAL
STATUS OF UNIT,%ST: NORMAL
UNIT DIVISION, %UDIV: 0
WBC # BLD AUTO: 11.4 K/UL (ref 3.6–11)

## 2022-07-20 PROCEDURE — P9016 RBC LEUKOCYTES REDUCED: HCPCS

## 2022-07-20 PROCEDURE — 74011000250 HC RX REV CODE- 250: Performed by: FAMILY MEDICINE

## 2022-07-20 PROCEDURE — 99232 SBSQ HOSP IP/OBS MODERATE 35: CPT | Performed by: SURGERY

## 2022-07-20 PROCEDURE — 85025 COMPLETE CBC W/AUTO DIFF WBC: CPT

## 2022-07-20 PROCEDURE — 36415 COLL VENOUS BLD VENIPUNCTURE: CPT

## 2022-07-20 PROCEDURE — 36430 TRANSFUSION BLD/BLD COMPNT: CPT

## 2022-07-20 PROCEDURE — 97116 GAIT TRAINING THERAPY: CPT

## 2022-07-20 PROCEDURE — 74011250636 HC RX REV CODE- 250/636: Performed by: SURGERY

## 2022-07-20 PROCEDURE — 74011250637 HC RX REV CODE- 250/637: Performed by: SURGERY

## 2022-07-20 PROCEDURE — 80048 BASIC METABOLIC PNL TOTAL CA: CPT

## 2022-07-20 PROCEDURE — 74011250637 HC RX REV CODE- 250/637: Performed by: STUDENT IN AN ORGANIZED HEALTH CARE EDUCATION/TRAINING PROGRAM

## 2022-07-20 PROCEDURE — 65660000001 HC RM ICU INTERMED STEPDOWN

## 2022-07-20 PROCEDURE — 86923 COMPATIBILITY TEST ELECTRIC: CPT

## 2022-07-20 PROCEDURE — 86900 BLOOD TYPING SEROLOGIC ABO: CPT

## 2022-07-20 PROCEDURE — 97530 THERAPEUTIC ACTIVITIES: CPT

## 2022-07-20 RX ORDER — SODIUM CHLORIDE 9 MG/ML
250 INJECTION, SOLUTION INTRAVENOUS AS NEEDED
Status: DISCONTINUED | OUTPATIENT
Start: 2022-07-20 | End: 2022-07-22 | Stop reason: HOSPADM

## 2022-07-20 RX ORDER — SODIUM CHLORIDE 9 MG/ML
250 INJECTION, SOLUTION INTRAVENOUS AS NEEDED
Status: CANCELLED | OUTPATIENT
Start: 2022-07-20

## 2022-07-20 RX ADMIN — HYDROMORPHONE HYDROCHLORIDE 1 MG: 1 INJECTION, SOLUTION INTRAMUSCULAR; INTRAVENOUS; SUBCUTANEOUS at 12:33

## 2022-07-20 RX ADMIN — HYDROMORPHONE HYDROCHLORIDE 1 MG: 1 INJECTION, SOLUTION INTRAMUSCULAR; INTRAVENOUS; SUBCUTANEOUS at 18:27

## 2022-07-20 RX ADMIN — SODIUM CHLORIDE 25 ML/HR: 9 INJECTION, SOLUTION INTRAVENOUS at 07:01

## 2022-07-20 RX ADMIN — Medication 3 MG: at 22:38

## 2022-07-20 RX ADMIN — POLYETHYLENE GLYCOL 3350 17 G: 17 POWDER, FOR SOLUTION ORAL at 09:17

## 2022-07-20 RX ADMIN — OXYCODONE AND ACETAMINOPHEN 1 TABLET: 5; 325 TABLET ORAL at 20:44

## 2022-07-20 RX ADMIN — OXYCODONE AND ACETAMINOPHEN 1 TABLET: 5; 325 TABLET ORAL at 09:16

## 2022-07-20 RX ADMIN — SODIUM CHLORIDE, PRESERVATIVE FREE 10 ML: 5 INJECTION INTRAVENOUS at 22:46

## 2022-07-20 RX ADMIN — OXYCODONE AND ACETAMINOPHEN 1 TABLET: 5; 325 TABLET ORAL at 15:15

## 2022-07-20 RX ADMIN — SODIUM CHLORIDE, PRESERVATIVE FREE 10 ML: 5 INJECTION INTRAVENOUS at 06:24

## 2022-07-20 RX ADMIN — HYDROMORPHONE HYDROCHLORIDE 1 MG: 1 INJECTION, SOLUTION INTRAMUSCULAR; INTRAVENOUS; SUBCUTANEOUS at 22:38

## 2022-07-20 RX ADMIN — SODIUM CHLORIDE, PRESERVATIVE FREE 10 ML: 5 INJECTION INTRAVENOUS at 17:21

## 2022-07-20 RX ADMIN — HYDROMORPHONE HYDROCHLORIDE 1 MG: 1 INJECTION, SOLUTION INTRAMUSCULAR; INTRAVENOUS; SUBCUTANEOUS at 04:24

## 2022-07-20 NOTE — PROGRESS NOTES
Comprehensive Nutrition Assessment    Type and Reason for Visit: Initial, RD nutrition re-screen/LOS    Nutrition Recommendations/Plan:   Continue Regular diet  Monitor Gi status - pt with bloating. 3.   Please document PO intake in flowsheet     Malnutrition Assessment:  Malnutrition Status: At risk for malnutrition (specify) (07/20/22 8364)    Context:  Acute illness     Findings of the 6 clinical characteristics of malnutrition:   Energy Intake:  50% or less of est energy requirements for 5 or more days  Weight Loss:  No significant weight loss     Body Fat Loss:  Unable to assess,     Muscle Mass Loss:  Unable to assess,    Fluid Accumulation:  No significant fluid accumulation,     Strength:  Not performed     Nutrition Assessment:         Pt admitted with Splenic laceration [S36.039A]    Past Medical History:   Diagnosis Date    Hypertension        Pt screened for LOS. Chart reviewed remotely. Pt with splenic injury after colonoscopy with PE. Pt is s/p IVCF and IR embolization. Pt with multiple units blood transfusion. Pt improving. Pain better with PO meds. No noted BM since admission. Pt advanced to Regular diet on 7/19 after ~5 days of mostly liquids. Pt had regular diet x2 meals on 7/13 and 7/16. No PO has been documented in flowsheet to determine how much pt has been consuming. Suspect pt is not meeting estimated needs based on complaints of bloating and lack of BM. Pt taking Miralax but no BM to date. No wt hx in EMR. No known food allergies. No hx of chew/swallow difficulties. Last BM:  ,  PTA    PO intake: No data found.     Wt Readings from Last 30 Encounters:   07/20/22 80.2 kg (176 lb 12.9 oz)           Nutrition Related Findings:           Current Nutrition Intake & Therapies:  Average Meal Intake: 51-75%  Average Supplement Intake: None ordered  ADULT DIET Regular    Anthropometric Measures:  Height: 5' 2.99\" (160 cm)  Ideal Body Weight (IBW): 115 lbs (52 kg)     Current Body Wt:  80.2 kg (176 lb 12.9 oz), 153.7 % IBW. Current BMI (kg/m2): 31.3        Weight Adjustment: No adjustment                 BMI Category: Obese class 1 (BMI 30.0-34. 9)    Estimated Daily Nutrient Needs:  Energy Requirements Based On: Formula  Weight Used for Energy Requirements: Current  Energy (kcal/day): 1686  Weight Used for Protein Requirements: Current  Protein (g/day): 80-96  Method Used for Fluid Requirements: 1 ml/kcal  Fluid (ml/day): 9054    Nutrition Diagnosis:   Predicted inadequate energy intake related to altered GI structure, acute injury/trauma as evidenced by intake 26-50%, GI abnormality    Nutrition Interventions:   Food and/or Nutrient Delivery: Continue current diet  Nutrition Education/Counseling: No recommendations at this time  Coordination of Nutrition Care: Continue to monitor while inpatient       Goals:     Goals: PO intake 50% or greater, by next RD assessment       Nutrition Monitoring and Evaluation:   Behavioral-Environmental Outcomes: None identified  Food/Nutrient Intake Outcomes: Food and nutrient intake  Physical Signs/Symptoms Outcomes: Biochemical data, Weight, GI status    Discharge Planning:    No discharge needs at this time    Gabby Osborn, [address]: [phone]

## 2022-07-20 NOTE — PROGRESS NOTES
Problem: Mobility Impaired (Adult and Pediatric)  Goal: *Acute Goals and Plan of Care (Insert Text)  Description: FUNCTIONAL STATUS PRIOR TO ADMISSION: Patient was independent and active without use of DME.    HOME SUPPORT PRIOR TO ADMISSION: The patient lived with spouse but did not require assist.    Physical Therapy Goals  Initiated 7/18/2022  1. Patient will move from supine to sit and sit to supine  in bed with independence within 7 day(s). 2.  Patient will transfer from bed to chair and chair to bed with independence using the least restrictive device within 7 day(s). 3.  Patient will perform sit to stand with independence within 7 day(s). 4.  Patient will ambulate with independence for 100 feet with the least restrictive device within 7 day(s). 5.  Patient will ascend/descend 16 stairs with one handrail(s) with independence within 7 day(s). Outcome: Progressing Towards Goal   PHYSICAL THERAPY TREATMENT  Patient: Smita Cassidy (46 y.o. female)  Date: 7/20/2022  Diagnosis: Splenic laceration [S36.039A] <principal problem not specified>      Precautions: Fall  Chart, physical therapy assessment, plan of care and goals were reviewed. ASSESSMENT  Patient continues with skilled PT services and is progressing towards goals. Patient with reported increased fatigue today, still with fluctuating HgB and now pending another blood transfusion. Patient agreeable to mobilizing prior to blood transfusion, stating she hoped it would help her abdominal distension and ability to have a BM. Patient completes bed mobility with SBA and bed modified with need for additional time 2/2 abdominal pain. Patient completed sit <> stands with CGA and ambulated with Cga-min A x 1 to bathroom where she was able to pass urine but with no BM. Patient ambulated a further approx. 40 feet within room with CGA-min A x 1 with intermittent use of one HHA or one hand on IV pole for support before returning to supine in bed with SBA. Of note, patient continues to endorse whole body itchiness and did create two small skin tears with blood present on R and L buttocks after scratching - RN aware. Patient will benefit from continued PT while admitted and continued recommendation for home with [facility] PT and family assist.      Current Level of Function Impacting Discharge (mobility/balance): ambulates 40 feet with CGA-min A x 1     Other factors to consider for discharge: unstable HgB          PLAN :  Patient continues to benefit from skilled intervention to address the above impairments. Continue treatment per established plan of care. to address goals. Recommendation for discharge: (in order for the patient to meet his/her long term goals)  Physical therapy at least 2 days/week in the home AND ensure assist and/or supervision for safety with mobility     This discharge recommendation:  A follow-up discussion with the attending provider and/or case management is planned    IF patient discharges home will need the following DME: none       SUBJECTIVE:   Patient stated I just hope I'm better after this transfusion .     OBJECTIVE DATA SUMMARY:   Critical Behavior:  Neurologic State: Alert  Orientation Level: Oriented X4  Cognition: Appropriate decision making, Appropriate for age attention/concentration, Appropriate safety awareness, Follows commands  Safety/Judgement: Awareness of environment  Functional Mobility Training:  Bed Mobility:  Rolling: Stand-by assistance  Supine to Sit: Stand-by assistance; Additional time  Sit to Supine: Stand-by assistance;Assist x1  Scooting: Stand-by assistance        Transfers:  Sit to Stand: Contact guard assistance;Assist x1  Stand to Sit: Contact guard assistance;Assist x1                             Balance:  Sitting: Intact; Without support  Standing: Impaired; With support  Standing - Static: Good;Constant support  Standing - Dynamic : Fair;Good;Constant support  Ambulation/Gait Training:  Distance (ft): 40 Feet (ft)  Assistive Device: Gait belt (intermittent HHA and use of IV pole for UE support)  Ambulation - Level of Assistance: Contact guard assistance;Minimal assistance;Assist x1        Gait Abnormalities: Decreased step clearance; Altered arm swing;Shuffling gait;Trunk sway increased        Base of Support: Widened     Speed/Kirsty: Slow;Shuffled  Step Length: Left shortened;Right shortened                Pain Ratin/10 abdominal pain - RN aware     Activity Tolerance:   Fair and limited by generalized fatigue and abdominal pain    After treatment patient left in no apparent distress:   Supine in bed, Heels elevated for pressure relief, Call bell within reach, and Caregiver / family present    COMMUNICATION/COLLABORATION:   The patients plan of care was discussed with: Occupational therapist and Registered nurse.      Ross Mclean, PT   Time Calculation: 43 mins

## 2022-07-20 NOTE — PROGRESS NOTES
Occupational Therapy   07.20.2022    Chart reviewed in prep for OT treatment. Patient preparing for transfusion of 1 unit PRBC (hgb 7.0 at this time). Will defer and f/u as able and medically appropriate. Thank you. Lit Carter MS, OTR/L

## 2022-07-20 NOTE — PROGRESS NOTES
Problem: Falls - Risk of  Goal: *Absence of Falls  Description: Document Alejandra Briones Fall Risk and appropriate interventions in the flowsheet.   Outcome: Progressing Towards Goal  Note: Fall Risk Interventions:  Mobility Interventions: Patient to call before getting OOB,Strengthening exercises (ROM-active/passive),Communicate number of staff needed for ambulation/transfer,Assess mobility with egress test         Medication Interventions: Evaluate medications/consider consulting pharmacy,Patient to call before getting OOB,Teach patient to arise slowly    Elimination Interventions: Call light in reach,Toileting schedule/hourly rounds,Bed/chair exit alarm    History of Falls Interventions: Bed/chair exit alarm,Door open when patient unattended         Problem: Patient Education: Go to Patient Education Activity  Goal: Patient/Family Education  Outcome: Progressing Towards Goal     Problem: Pain  Goal: *Control of Pain  Outcome: Progressing Towards Goal  Goal: *PALLIATIVE CARE:  Alleviation of Pain  Outcome: Progressing Towards Goal     Problem: Patient Education: Go to Patient Education Activity  Goal: Patient/Family Education  Outcome: Progressing Towards Goal     Problem: Patient Education: Go to Patient Education Activity  Goal: Patient/Family Education  Outcome: Progressing Towards Goal

## 2022-07-20 NOTE — PROGRESS NOTES
Bedside shift change report given to Nick Ospina RN (oncoming nurse) by Mary Ann Villa RN (offgoing nurse). Report included the following information SBAR, MAR, Recent Results, and Cardiac Rhythm NSR .

## 2022-07-20 NOTE — PROGRESS NOTES
I prayed with Ray Roth and celebrated with her the [address]. I also gave her Communion.   Father Lia Rodriguez

## 2022-07-20 NOTE — PROGRESS NOTES
Surgery Progress Note    7/20/2022    Admit Date: 7/12/2022    CC: Abd pain    7/17: IR splenic embolization    Subjective:     Did better yesterday. Percocet helped a lot. Passing gas still. Up in the chair for about 6 hours. Still with moderate abdominal pain and bloating    Constitutional: No fever or chills  Neurologic: Headache  Eyes: No scleral icterus or irritated eyes  Nose: No nasal pain or drainage  Mouth: No oral lesions or sore throat  Cardiac: No palpations or chest pain  Pulmonary: No cough or shortness of breath  Gastrointestinal: Abd pain, bloating, no nausea, emesis, diarrhea, or constipation  Genitourinary: No dysuria  Musculoskeletal: No muscle or joint tenderness  Skin: No rashes or lesions  Psychiatric: No anxiety or depressed mood    Objective:     Visit Vitals  /75 (BP 1 Location: Right upper arm, BP Patient Position: Lying)   Pulse (!) 102   Temp 99.1 °F (37.3 °C)   Resp 22   Ht 5' 3\" (1.6 m)   Wt 176 lb 12.9 oz (80.2 kg)   SpO2 99%   BMI 31.32 kg/m²       General: No acute distress, conversant  Eyes: PERRLA, no scleral icterus  HENT: Normocephalic without oral lesions  Neck: Trachea midline without LAD  Cardiac: Normal pulse rate and rhythm  Pulmonary: Symmetric chest rise with normal effort, NC in place  GI: Soft, moderate tenderness in mid abdomen, bloated no hernia, no bruising to the abdomen at this time  Skin: Warm without rash  Extremities: Right IR site w/o hematoma  Psych: Appropriate mood and affect    Labs, vital signs, and I/O reviewed. Assessment:     73 y/o F s/p splenic injury after colonoscopy with PE s/p IVCF and IR embolization with good response to blood transfusion 24 hours ago with stable hemoglobin    Plan:     PRN pain control. Percocet as needed. Okay to stop IV fluids from my point of view  PE- IVCF in place. Wean oxygen  Okay for reg diet. Passing gas.  Miralax  OOB to chair and walk  S/p splenic artery embolization-Triple vac given  Appropriate response to blood transfusion from 6.1-7.1 yesterday morning. Repeat blood draw after that was 7.8 which I think was somewhat erroneous. This morning it is 7. Ultimately 8.1 drop in the last 24 hours with stable vital signs, good urine output, good creatinine. I will transfuse her 1 more unit of blood today and check level tomorrow. I do not feel she is experiencing ongoing bleeding. I have discussed this with the hospitalist and IR in the last 48 hours and think we can treat her without further procedural intervention, obviously pending stable vital signs and a.m. labs. I am hopeful that with an appropriate response of hemoglobin tomorrow to the transfusion today, will be done checking labs and she will be close to discharge, as long as her pain is controlled, in the next 24 to 48 hours.       Joy Mckee MD  Bariatric and General Surgeon  [address] [place] [address] [facility]

## 2022-07-21 ENCOUNTER — APPOINTMENT (OUTPATIENT)
Dept: GENERAL RADIOLOGY | Age: 67
DRG: 907 | End: 2022-07-21
Attending: SURGERY
Payer: COMMERCIAL

## 2022-07-21 LAB
ABO + RH BLD: NORMAL
ANION GAP SERPL CALC-SCNC: 8 MMOL/L (ref 5–15)
BASOPHILS # BLD: 0 K/UL (ref 0–0.1)
BASOPHILS NFR BLD: 0 % (ref 0–1)
BLD PROD TYP BPU: NORMAL
BLOOD GROUP ANTIBODIES SERPL: NORMAL
BPU ID: NORMAL
BUN SERPL-MCNC: 8 MG/DL (ref 6–20)
BUN/CREAT SERPL: 21 (ref 12–20)
CALCIUM SERPL-MCNC: 8 MG/DL (ref 8.5–10.1)
CHLORIDE SERPL-SCNC: 104 MMOL/L (ref 97–108)
CO2 SERPL-SCNC: 24 MMOL/L (ref 21–32)
CREAT SERPL-MCNC: 0.39 MG/DL (ref 0.55–1.02)
CROSSMATCH RESULT,%XM: NORMAL
DIFFERENTIAL METHOD BLD: ABNORMAL
EOSINOPHIL # BLD: 0.3 K/UL (ref 0–0.4)
EOSINOPHIL NFR BLD: 3 % (ref 0–7)
ERYTHROCYTE [DISTWIDTH] IN BLOOD BY AUTOMATED COUNT: 20.2 % (ref 11.5–14.5)
GLUCOSE SERPL-MCNC: 106 MG/DL (ref 65–100)
HCT VFR BLD AUTO: 25.2 % (ref 35–47)
HGB BLD-MCNC: 8.5 G/DL (ref 11.5–16)
IMM GRANULOCYTES # BLD AUTO: 0.1 K/UL (ref 0–0.04)
IMM GRANULOCYTES NFR BLD AUTO: 1 % (ref 0–0.5)
LYMPHOCYTES # BLD: 1 K/UL (ref 0.8–3.5)
LYMPHOCYTES NFR BLD: 9 % (ref 12–49)
MCH RBC QN AUTO: 30.5 PG (ref 26–34)
MCHC RBC AUTO-ENTMCNC: 33.7 G/DL (ref 30–36.5)
MCV RBC AUTO: 90.3 FL (ref 80–99)
MONOCYTES # BLD: 1.1 K/UL (ref 0–1)
MONOCYTES NFR BLD: 10 % (ref 5–13)
NEUTS SEG # BLD: 8.5 K/UL (ref 1.8–8)
NEUTS SEG NFR BLD: 77 % (ref 32–75)
NRBC # BLD: 0.03 K/UL (ref 0–0.01)
NRBC BLD-RTO: 0.3 PER 100 WBC
PLATELET # BLD AUTO: 244 K/UL (ref 150–400)
PMV BLD AUTO: 9.2 FL (ref 8.9–12.9)
POTASSIUM SERPL-SCNC: 3.7 MMOL/L (ref 3.5–5.1)
RBC # BLD AUTO: 2.79 M/UL (ref 3.8–5.2)
RBC MORPH BLD: ABNORMAL
RBC MORPH BLD: ABNORMAL
SODIUM SERPL-SCNC: 136 MMOL/L (ref 136–145)
SPECIMEN EXP DATE BLD: NORMAL
STATUS OF UNIT,%ST: NORMAL
UNIT DIVISION, %UDIV: 0
WBC # BLD AUTO: 11 K/UL (ref 3.6–11)

## 2022-07-21 PROCEDURE — 97530 THERAPEUTIC ACTIVITIES: CPT

## 2022-07-21 PROCEDURE — 99232 SBSQ HOSP IP/OBS MODERATE 35: CPT | Performed by: SURGERY

## 2022-07-21 PROCEDURE — 74011250637 HC RX REV CODE- 250/637: Performed by: SURGERY

## 2022-07-21 PROCEDURE — 85025 COMPLETE CBC W/AUTO DIFF WBC: CPT

## 2022-07-21 PROCEDURE — 74011250636 HC RX REV CODE- 250/636: Performed by: SURGERY

## 2022-07-21 PROCEDURE — 74011000250 HC RX REV CODE- 250: Performed by: FAMILY MEDICINE

## 2022-07-21 PROCEDURE — 97116 GAIT TRAINING THERAPY: CPT

## 2022-07-21 PROCEDURE — 36415 COLL VENOUS BLD VENIPUNCTURE: CPT

## 2022-07-21 PROCEDURE — 74011250637 HC RX REV CODE- 250/637: Performed by: STUDENT IN AN ORGANIZED HEALTH CARE EDUCATION/TRAINING PROGRAM

## 2022-07-21 PROCEDURE — 71045 X-RAY EXAM CHEST 1 VIEW: CPT

## 2022-07-21 PROCEDURE — 65660000001 HC RM ICU INTERMED STEPDOWN

## 2022-07-21 PROCEDURE — 80048 BASIC METABOLIC PNL TOTAL CA: CPT

## 2022-07-21 RX ORDER — POLYETHYLENE GLYCOL 3350 17 G/17G
17 POWDER, FOR SOLUTION ORAL DAILY
Qty: 14 PACKET | Refills: 1 | Status: SHIPPED | OUTPATIENT
Start: 2022-07-21

## 2022-07-21 RX ORDER — FUROSEMIDE 10 MG/ML
20 INJECTION INTRAMUSCULAR; INTRAVENOUS ONCE
Status: COMPLETED | OUTPATIENT
Start: 2022-07-21 | End: 2022-07-21

## 2022-07-21 RX ORDER — OXYCODONE AND ACETAMINOPHEN 5; 325 MG/1; MG/1
1 TABLET ORAL
Qty: 18 TABLET | Refills: 0 | Status: SHIPPED | OUTPATIENT
Start: 2022-07-21 | End: 2022-07-24

## 2022-07-21 RX ORDER — CYCLOBENZAPRINE HCL 10 MG
10 TABLET ORAL ONCE
Status: COMPLETED | OUTPATIENT
Start: 2022-07-21 | End: 2022-07-21

## 2022-07-21 RX ORDER — CYCLOBENZAPRINE HCL 10 MG
10 TABLET ORAL
Status: DISCONTINUED | OUTPATIENT
Start: 2022-07-21 | End: 2022-07-22 | Stop reason: HOSPADM

## 2022-07-21 RX ADMIN — SODIUM CHLORIDE, PRESERVATIVE FREE 10 ML: 5 INJECTION INTRAVENOUS at 06:47

## 2022-07-21 RX ADMIN — OXYCODONE AND ACETAMINOPHEN 1 TABLET: 5; 325 TABLET ORAL at 07:17

## 2022-07-21 RX ADMIN — HYDROMORPHONE HYDROCHLORIDE 1 MG: 1 INJECTION, SOLUTION INTRAMUSCULAR; INTRAVENOUS; SUBCUTANEOUS at 04:13

## 2022-07-21 RX ADMIN — FUROSEMIDE 20 MG: 10 INJECTION, SOLUTION INTRAMUSCULAR; INTRAVENOUS at 09:17

## 2022-07-21 RX ADMIN — HYDROMORPHONE HYDROCHLORIDE 1 MG: 1 INJECTION, SOLUTION INTRAMUSCULAR; INTRAVENOUS; SUBCUTANEOUS at 11:52

## 2022-07-21 RX ADMIN — CYCLOBENZAPRINE 10 MG: 10 TABLET, FILM COATED ORAL at 09:17

## 2022-07-21 RX ADMIN — POLYETHYLENE GLYCOL 3350 17 G: 17 POWDER, FOR SOLUTION ORAL at 09:17

## 2022-07-21 RX ADMIN — Medication 3 MG: at 21:20

## 2022-07-21 RX ADMIN — OXYCODONE AND ACETAMINOPHEN 1 TABLET: 5; 325 TABLET ORAL at 15:33

## 2022-07-21 RX ADMIN — SODIUM CHLORIDE, PRESERVATIVE FREE 10 ML: 5 INJECTION INTRAVENOUS at 22:29

## 2022-07-21 RX ADMIN — HYDROMORPHONE HYDROCHLORIDE 1 MG: 1 INJECTION, SOLUTION INTRAMUSCULAR; INTRAVENOUS; SUBCUTANEOUS at 21:20

## 2022-07-21 NOTE — PROGRESS NOTES
Surgery Progress Note    7/21/2022    Admit Date: 7/12/2022    CC: Abd pain    7/17: IR splenic embolization    Subjective:     Okay overnight. Pain moderate but stable. Passing gas. No nausea. Still with weakness. Constitutional: No fever or chills  Neurologic: Headache  Eyes: No scleral icterus or irritated eyes  Nose: No nasal pain or drainage  Mouth: No oral lesions or sore throat  Cardiac: No palpations or chest pain  Pulmonary: No cough or shortness of breath  Gastrointestinal: Abd pain, bloating, no nausea, emesis, diarrhea, or constipation  Genitourinary: No dysuria  Musculoskeletal: No muscle or joint tenderness  Skin: No rashes or lesions  Psychiatric: No anxiety or depressed mood    Objective:     Visit Vitals  BP (!) 134/90 (BP 1 Location: Right upper arm, BP Patient Position: At rest)   Pulse 86   Temp 98.7 °F (37.1 °C)   Resp 22   Ht 5' 2.99\" (1.6 m)   Wt 169 lb 5 oz (76.8 kg)   SpO2 97%   BMI 30.00 kg/m²       General: No acute distress, conversant  Eyes: PERRLA, no scleral icterus  HENT: Normocephalic without oral lesions  Neck: Trachea midline without LAD  Cardiac: Normal pulse rate and rhythm  Pulmonary: Symmetric chest rise with normal effort, NC in place  GI: Soft, moderate tenderness in mid abdomen, bloated, no hernia, no bruising to the abdomen at this time  Skin: Warm without rash  Extremities: Right IR site w/o hematoma  Psych: Appropriate mood and affect    Labs, vital signs, and I/O reviewed. Assessment:     73 y/o F s/p splenic injury after colonoscopy with PE s/p IVCF and IR embolization with good response to blood transfusion 24 hours ago with stable hemoglobin    Plan:     PRN pain control. Percocet as needed. Flexeril now  PE- IVCF in place. Wean oxygen. CXR and lasix this AM  Reg diet. Passing gas. Miralax  OOB to chair and walk  S/p splenic artery embolization-Triple vac given  Appropriate response to blood transfusion yesterday. Stop checking labs.    Possibly home tomorrow or Saturday if can get off Arti Cr MD  Bariatric and General Surgeon  [facility]

## 2022-07-21 NOTE — PROGRESS NOTES
Bedside shift change report given to Aury Higgins RN (oncoming nurse) by Misael De Luna RN (offgoing nurse). Report included the following information SBAR, Intake/Output, MAR, Recent Results, and Cardiac Rhythm NSR .

## 2022-07-21 NOTE — PROGRESS NOTES
Problem: Mobility Impaired (Adult and Pediatric)  Goal: *Acute Goals and Plan of Care (Insert Text)  Description: FUNCTIONAL STATUS PRIOR TO ADMISSION: Patient was independent and active without use of DME.    HOME SUPPORT PRIOR TO ADMISSION: The patient lived with spouse but did not require assist.    Physical Therapy Goals  Initiated 7/18/2022  1. Patient will move from supine to sit and sit to supine  in bed with independence within 7 day(s). 2.  Patient will transfer from bed to chair and chair to bed with independence using the least restrictive device within 7 day(s). 3.  Patient will perform sit to stand with independence within 7 day(s). 4.  Patient will ambulate with independence for 100 feet with the least restrictive device within 7 day(s). 5.  Patient will ascend/descend 16 stairs with one handrail(s) with independence within 7 day(s). Outcome: Progressing Towards Goal   PHYSICAL THERAPY TREATMENT  Patient: Yenny Elam (85 y.o. female)  Date: 7/21/2022  Diagnosis: Splenic laceration [S36.039A] <principal problem not specified>      Precautions: Fall  Chart, physical therapy assessment, plan of care and goals were reviewed. ASSESSMENT  Patient continues with skilled PT services and is progressing towards goals. Pt continues to be limited by generalized weakness and fatigue as well as impaired standing balance. She was able to amb to the bathroom and voided and amb increased distance today. Had some difficulty getting consistent reliable Sp02 readings during amb. Briefly pulse ox read in the mid 80s but then samira to 89% with pursed lip breathing. Pt remained on room air throughout session and post amb pt sat up in the chair and Sp02 was 92%. She remains on track for discharge to home with assist prn. .     Current Level of Function Impacting Discharge (mobility/balance): at most provided min assist.    Other factors to consider for discharge: flight of stairs in her home, labile Hgb this admission requiring transfusions, and baseline is room air          PLAN :  Patient continues to benefit from skilled intervention to address the above impairments. Continue treatment per established plan of care. to address goals. Recommendation for discharge: (in order for the patient to meet his/her long term goals)  Physical therapy at least 2 days/week in the home AND ensure assist and/or supervision for safety with mobility prn    This discharge recommendation:  A follow-up discussion with the attending provider and/or case management is planned    IF patient discharges home will need the following DME: none  from PT       SUBJECTIVE:   Patient stated I have a rash on my back.  Rash noted, discussed with her nurse. OBJECTIVE DATA SUMMARY:   Chart checked, pt cleared by nursing  Critical Behavior:  Neurologic State: Alert  Orientation Level: Oriented X4  Cognition: Appropriate decision making, Appropriate for age attention/concentration, Appropriate safety awareness, Follows commands  Safety/Judgement: Awareness of environment  Functional Mobility Training:  Bed Mobility:  Rolling: Stand-by assistance  Supine to Sit: Stand-by assistance;Bed Modified; Adaptive equipment              Transfers:  Sit to Stand: Contact guard assistance  Stand to Sit: Contact guard assistance                             Balance:  Sitting: Intact; Without support  Standing: Impaired; Without support  Standing - Static: Good  Standing - Dynamic : Fair;Constant support  Ambulation/Gait Training:  Distance (ft): 125 Feet (ft) with standing rest breaks  Assistive Device: Gait belt (iwth noted periodic reaching out for suport of nearby objects)  Ambulation - Level of Assistance: Contact guard assistance (occasional min assist)        Gait Abnormalities: Decreased step clearance; Altered arm swing        Base of Support: Widened     Speed/Kirsty: Slow  Step Length: Left shortened;Right shortened                    Stairs: Therapeutic Exercises:   Encouraged ankle pumps and reviewed use of incentive spirometer and pursed lip breathing  Pain Rating:  None rated    Activity Tolerance:   Good, requires rest breaks, and see assessment    After treatment patient left in no apparent distress:   Sitting in chair and Call bell within reach    COMMUNICATION/COLLABORATION:   The patients plan of care was discussed with: Registered nurseBenedict Medley Shadow   Time Calculation: 37 mins

## 2022-07-21 NOTE — PROGRESS NOTES
Rounded on [facility] patients and provided Anointing of the Sick at request of patient.     Hemalatha Garcia

## 2022-07-21 NOTE — PROGRESS NOTES
Patient's care transferred to surgical team.Dr.Craig morin  Please call hospitalist service if needed again  Will sign off

## 2022-07-21 NOTE — PROGRESS NOTES
Bedside shift change report given to Cooper Ford RN (oncoming nurse) by Kalin Alvarado (offgoing nurse). Report included the following information SBAR, Procedure Summary, Intake/Output, MAR, Recent Results, and Cardiac Rhythm NSR .

## 2022-07-22 VITALS
BODY MASS INDEX: 30 KG/M2 | SYSTOLIC BLOOD PRESSURE: 139 MMHG | HEIGHT: 63 IN | DIASTOLIC BLOOD PRESSURE: 91 MMHG | WEIGHT: 169.31 LBS | HEART RATE: 94 BPM | OXYGEN SATURATION: 92 % | TEMPERATURE: 99.6 F | RESPIRATION RATE: 18 BRPM

## 2022-07-22 PROCEDURE — 74011250637 HC RX REV CODE- 250/637: Performed by: SURGERY

## 2022-07-22 PROCEDURE — 74011250637 HC RX REV CODE- 250/637: Performed by: STUDENT IN AN ORGANIZED HEALTH CARE EDUCATION/TRAINING PROGRAM

## 2022-07-22 PROCEDURE — 74011250636 HC RX REV CODE- 250/636: Performed by: SURGERY

## 2022-07-22 PROCEDURE — 74011000250 HC RX REV CODE- 250: Performed by: FAMILY MEDICINE

## 2022-07-22 PROCEDURE — 74011250637 HC RX REV CODE- 250/637: Performed by: FAMILY MEDICINE

## 2022-07-22 PROCEDURE — 99232 SBSQ HOSP IP/OBS MODERATE 35: CPT | Performed by: SURGERY

## 2022-07-22 RX ORDER — CYCLOBENZAPRINE HCL 10 MG
10 TABLET ORAL
Qty: 10 TABLET | Refills: 0 | Status: SHIPPED | OUTPATIENT
Start: 2022-07-22

## 2022-07-22 RX ADMIN — HYDROMORPHONE HYDROCHLORIDE 1 MG: 1 INJECTION, SOLUTION INTRAMUSCULAR; INTRAVENOUS; SUBCUTANEOUS at 04:51

## 2022-07-22 RX ADMIN — POLYETHYLENE GLYCOL 3350 17 G: 17 POWDER, FOR SOLUTION ORAL at 10:55

## 2022-07-22 RX ADMIN — HYDROMORPHONE HYDROCHLORIDE 1 MG: 1 INJECTION, SOLUTION INTRAMUSCULAR; INTRAVENOUS; SUBCUTANEOUS at 10:55

## 2022-07-22 RX ADMIN — ACETAMINOPHEN 650 MG: 325 TABLET ORAL at 11:00

## 2022-07-22 RX ADMIN — SODIUM CHLORIDE, PRESERVATIVE FREE 10 ML: 5 INJECTION INTRAVENOUS at 07:01

## 2022-07-22 NOTE — DISCHARGE SUMMARY
Admit date: 7/12/2022   Admitting Provider: Brit Ng MD    Discharge date: 7/22/2022  Discharging Provider: Barrett Luther MD      * Admission Diagnoses: Splenic laceration [I13.356B]    * Discharge Diagnoses:    Hospital Problems as of 7/22/2022 Never Reviewed            Codes Class Noted - Resolved POA    Splenic laceration ICD-10-CM: S36.039A  ICD-9-CM: 865.09  7/12/2022 - Present Unknown           * Hospital Course:  64-year-old female presents after a colonoscopy with severe abdominal pain. Found to have a splenic laceration along with pulmonary embolism. She was managed with an IVC filter and serial hemoglobins. Unfortunately she had an acute episode with hemoglobin drop. She underwent Gelfoam embolization of her spleen. After this she was able to transfuse to a healthy level. Her pain was controlled. Diet was advanced. She was given the triple vaccine series and discharged home with pain control.   Plan for Xarelto and IVC filter removal in the next few weeks    * Procedures:   Cardiology and IR Orders (From admission to next 24h)       Start     Ordered    07/16/22 2200  IR OCCL [facility] W SI  [[id number]]  ONE TIME         07/16/22 2155    07/15/22 0400  IR PLC IVC FILTER  [[id number]]  TOMORROW AM         07/14/22 1814                    Consults: IR      Discharge Exam:  Visit Vitals  BP (!) 137/93 (BP 1 Location: Right upper arm, BP Patient Position: At rest)   Pulse 88   Temp 98.5 °F (36.9 °C)   Resp 18   Ht 5' 2.99\" (1.6 m)   Wt 169 lb 5 oz (76.8 kg)   SpO2 92%   BMI 30.00 kg/m²     General: No acute distress, conversant  Eyes: PERRLA, no scleral icterus  HENT: Normocephalic without oral lesions  Neck: Trachea midline without LAD  Cardiac: Normal pulse rate and rhythm  Pulmonary: Symmetric chest rise with normal effort  GI: Soft, mild tenderness, distended, no hernia, no splenomegaly  Skin: Warm without rash  Extremities: No edema or joint stiffness  Psych: Appropriate mood and affect      * Discharge Condition: good  * Disposition: Home    Discharge Medications:  Current Discharge Medication List        START taking these medications    Details   cyclobenzaprine (FLEXERIL) 10 mg tablet Take 1 Tablet by mouth three (3) times daily as needed for Muscle Spasm(s). Qty: 10 Tablet, Refills: 0  Start date: 7/22/2022      polyethylene glycol (MIRALAX) 17 gram packet Take 1 Packet by mouth in the morning. May take 1-2 packets as needed daily for constipation. Qty: 14 Packet, Refills: 1  Start date: 7/21/2022      oxyCODONE-acetaminophen (PERCOCET) 5-325 mg per tablet Take 1 Tablet by mouth every four (4) hours as needed for Pain (Can take 2 tablets if needed for breakthrough pain if needed) for up to 3 days. Max Daily Amount: 6 Tablets. Qty: 18 Tablet, Refills: 0  Start date: 7/21/2022, End date: 7/24/2022    Associated Diagnoses: Laceration of spleen, subsequent encounter           CONTINUE these medications which have NOT CHANGED    Details   amLODIPine (NORVASC) 5 mg tablet Take 5 mg by mouth daily. olmesartan (BENICAR) 20 mg tablet TAKE 1 TABLET BY MOUTH EVERY DAY FOR 90 DAYS           STOP taking these medications       doxycycline (PERIOSTAT) 20 mg tablet Comments:   Reason for Stopping:         hydroCHLOROthiazide (HYDRODIURIL) 25 mg tablet Comments:   Reason for Stopping:               * Follow-up Care/Patient Instructions:   Activity: Activity as tolerated  Diet: Regular Diet  Wound Care: None needed    Follow-up Information       Follow up With Specialties Details Why Contact Info    Roly Schafer MD Family Medicine  rpt CTA chest 4 weeks for eval of PE  [address]  [place] [address] [place] [address] Drive  [phone]      Juan Jose Martinez MD General Surgery, Bariatrics Follow up in 2 week(s) splenic laceration f/u  [address] [place] [address] [address] [facility]      Flash Tay MD Radiology, Diagnostic Radiology Follow up in 4 week(s) for IVCF removal [address] [place] [address] [address]              Signed:  Concepción Lawson MD  7/22/2022  8:47 AM

## 2022-07-22 NOTE — PROGRESS NOTES
I prayed with Cecilia Becerra and celebrated with her the [address].   Father Gustavo Carpenter

## 2022-07-22 NOTE — DISCHARGE INSTRUCTIONS
Discharge Instructions for General Surgery Patients     Follow-up with radiology in 4 weeks to discuss removal of IVC filter. Follow-up with me in 2 weeks to discuss anticoagulation and overall management from splenic injury. Do not lift any objects weighing more than 15 pounds for 2 weeks. Do not do any housework including vacuuming, scrubbing or yardwork for 4 weeks. Do not drive or operate machinery while taking sedating or narcotic medications. Post operative pain is expected. Try to wean off narcotics as soon as able and take tylenol or NSAIDS. Do not take tylenol with Norco or Percocet as this may harm your liver. No NSAIDS if you are bariatric surgery patient. You may walk as desired and go up and down stairs as needed. Walking is encouraged. You may shower. Regular diet. Take Miralax once or twice a day as needed for constipation. Follow up with provider as scheduled. If you experience fever (greater than 101.5), chills, vomiting or redness or drainage at surgical site, please contact your surgeons office. If you have further questions or concerns, please call your surgeons office at [phone].

## 2022-07-22 NOTE — PROGRESS NOTES
Transition Plan of Care  RUR 11%-Med  Disposition-discharging home today with no skilled needs. Family can provide transport. CM gave patient coupon for Xarelto. Medicare pt has received, reviewed, and signed 2nd IM letter informing them of their right to appeal the discharge. Signed copy has been placed on pt bedside chart.

## 2022-07-22 NOTE — PROGRESS NOTES
Bedside shift change report given to EV Mcguire (oncoming nurse) by Ricardo Lofton RN (offgoing nurse). Report included the following information SBAR, Kardex, Intake/Output, MAR, Recent Results, and Cardiac Rhythm NSR .

## 2022-07-25 ENCOUNTER — TELEPHONE (OUTPATIENT)
Dept: SURGERY | Age: 67
End: 2022-07-25

## 2022-07-25 ENCOUNTER — PATIENT MESSAGE (OUTPATIENT)
Dept: SURGERY | Age: 67
End: 2022-07-25

## 2022-07-25 RX ORDER — ONDANSETRON 4 MG/1
4 TABLET, ORALLY DISINTEGRATING ORAL
Qty: 30 TABLET | Refills: 1 | Status: SHIPPED | OUTPATIENT
Start: 2022-07-25 | End: 2022-08-29

## 2022-07-25 NOTE — TELEPHONE ENCOUNTER
Patient identified with two patient identifiers with  EC on file Amara Conrad. Informed him per Dr. Linus Walsh ok to return to work from home as of today. He will  letter from  [address]. MOVL link to set up access text to patients cell number on file.

## 2022-07-25 NOTE — LETTER
NOTIFICATION RETURN TO WORK / SCHOOL    7/25/2022 9:23 AM    Ms. Sherri Barnes  [id number] [address] [phone]      To Whom It May Concern:    Sherri Barnes is currently under the care of Guillermo Cheng. She will return to work/school on: 7/25/2022 remotely (work from home). Patient will be seen 8/1/2022 for follow up will discuss work status at that time. If there are questions or concerns please have the patient contact our office.         Sincerely,      Cecilia Weiss MD

## 2022-07-25 NOTE — TELEPHONE ENCOUNTER
Patient  present in the office to  work letter states patient is requesting Anti-nausea medication. Spouse informed request will be sent to provider to review.

## 2022-08-01 ENCOUNTER — OFFICE VISIT (OUTPATIENT)
Dept: SURGERY | Age: 67
End: 2022-08-01
Payer: COMMERCIAL

## 2022-08-01 VITALS
HEIGHT: 63 IN | WEIGHT: 147 LBS | HEART RATE: 100 BPM | BODY MASS INDEX: 26.05 KG/M2 | TEMPERATURE: 98.7 F | OXYGEN SATURATION: 92 % | SYSTOLIC BLOOD PRESSURE: 111 MMHG | RESPIRATION RATE: 20 BRPM | DIASTOLIC BLOOD PRESSURE: 78 MMHG

## 2022-08-01 DIAGNOSIS — I26.93 SINGLE SUBSEGMENTAL PULMONARY EMBOLISM WITHOUT ACUTE COR PULMONALE (HCC): ICD-10-CM

## 2022-08-01 DIAGNOSIS — Z95.828 PRESENCE OF IVC FILTER: Primary | ICD-10-CM

## 2022-08-01 DIAGNOSIS — S36.039D LACERATION OF SPLEEN, SUBSEQUENT ENCOUNTER: ICD-10-CM

## 2022-08-01 PROCEDURE — 1123F ACP DISCUSS/DSCN MKR DOCD: CPT | Performed by: SURGERY

## 2022-08-01 PROCEDURE — 99215 OFFICE O/P EST HI 40 MIN: CPT | Performed by: SURGERY

## 2022-08-01 NOTE — PROGRESS NOTES
Surgery Progress Note    8/1/2022    CC: Splenic laceration    Subjective:     Patient is roughly a month out after sustaining a splenic laceration during a routine colonoscopy. She was managed initially with observation but had a acute hemorrhage. Taken to angio for Gelfoam embolization. Postoperatively she received multiple units of blood but ultimately stabilized. Her pain was controlled. Of note, she did develop a pulmonary embolism which complicated her whole hospital course. She has an IVC filter in place. We had been holding off on therapeutic anticoagulation given her slow drift down and IVC filter in place. She presents today emotionally distraught regarding all the events after the colonoscopy. She is stressed about work and about bills. She still has some lingering abdominal pain which appears to be dull in nature at about a 5 or 6 out of 10. Pressing the area worsens the pain. She is also had constipation. She is been taking MiraLAX for this but then has blowouts. She is now taking some Colace to better regulate her bowel function. She has lost significant weight in the last few weeks possibly secondary to fluid losses versus lack of appetite and stress. Has been taking Zofran as needed for nausea. She saw her PCP a few weeks ago, Brittny Locke, who is planning on doing a repeat CTA of her chest in the near future.     Constitutional: No fever or chills  Neurologic: No headache  Eyes: No scleral icterus or irritated eyes  Nose: No nasal pain or drainage  Mouth: No oral lesions or sore throat  Cardiac: No palpations or chest pain  Pulmonary: No cough or shortness of breath  Gastrointestinal: Abdominal pain and nausea, no emesis, diarrhea, or constipation  Genitourinary: No dysuria  Musculoskeletal: No muscle or joint tenderness  Skin: No rashes or lesions  Psychiatric: No anxiety or depressed mood    Objective:   Visit Vitals  /78   Pulse 100   Temp 98.7 °F (37.1 °C)   Resp 20 Ht 5' 3\" (1.6 m)   Wt 147 lb (66.7 kg)   SpO2 92%   BMI 26.04 kg/m²       General: No acute distress, conversant  Eyes: PERRLA, no scleral icterus  HENT: Normocephalic without oral lesions  Neck: Trachea midline without LAD  Cardiac: Normal pulse rate and rhythm  Pulmonary: Symmetric chest rise with normal effort  GI: Soft, mild tenderness in the left upper quadrant, ND, no hernia, no splenomegaly  Skin: Warm without rash  Extremities: No edema or joint stiffness  Psych: Appropriate mood and affect    Assessment:   75-year-old female who unfortunately sustained an iatrogenic splenic laceration after colonoscopy followed by development of a pulmonary embolism and subsequent splenic embolization for continued hemorrhage now working on recovery    Plan:     Long discussion with the patient about prior events and future plan. Patient is emotionally distraught regarding the events. I personally called GUTIERREZ Chadwick, and we discussed her care going forward. He plans on putting her on medication to help with mood stabilization. I will have my nurse call IR to help arrange for IVC filter removal in the near future. I think it is now time to start Xarelto for at least 6 to 8 months as deemed necessary by her primary care physician. I have given her the Xarelto discount cards. I ordered her a 3 prescription regimen to cover her. Plan to remove IVC filter with Xarelto window hopefully per IR. Last labs at her PCP shows a hemoglobin greater than 10. Platelets were into the 800s. I suspect she has a moderate loss of splenic function. She received triple vaccination in the hospital.  Defer to PCP to manage this going forward with boosters as appropriate. CTA will be done on August 15 to look at the pulmonary embolism and will also see a small cut of the spleen to assess for overall perfusion. I suspect she does not have a complete splenic embolization.     Abdominal pain should continue to improve but may take another month or so for the blood to absorb. This is expected. As needed pain control, Zofran, and Colace/MiraLAX for bowel regimen. Overall, the patient is progressing as expected. No acute surgical issues. Follow-up with me as needed. PCP fully aware of the plan and the plan transfer care to his service. I spent over 50 minutes coordinating her care today including talking with her primary care physician, arranging Xarelto discount cards, Xarelto prescription, coordinating with IR, giving work excuse notes, and charting on the patient along with face-to-face interaction with the patient.       Barrett Luther MD  Bariatric and General Surgeon  [facility]

## 2022-08-01 NOTE — PROGRESS NOTES
1. Have you been to the ER, urgent care clinic since your last visit? Hospitalized since your last visit? No    2. Have you seen or consulted any other health care providers outside of the [address] [place] [address] since your last visit? Include any pap smears or colon screening.  No

## 2022-08-01 NOTE — PROGRESS NOTES
To whom it may concern:    Ms. Deanna Hermosillo was hospitalized at [facility] in [facility], [address] following a procedure. They were hospitalized for many days. Their prognosis for full recovery is good. I recommend that they remain at home, but can still work virtually, until at August 16th, 2022    If there are any questions, please feel free to call our office. Sincerely,      Akbar Garza MD    Bariatric and General Surgeon  [facility]   [address] [facility], [address] [place] [address].  [address] [place] [address]: [phone]   F: [phone]

## 2022-08-02 ENCOUNTER — TELEPHONE (OUTPATIENT)
Dept: SURGERY | Age: 67
End: 2022-08-02

## 2022-08-02 NOTE — TELEPHONE ENCOUNTER
I called and spoke with IR. Scheduled patient for IVCF removal on Tuesday 8/9/22 at 8:00 am. She needs to arrive at 7:00 am. NPO 6 hours prior to the procedure. She will need a . She will need to stop the Xarelto two days prior to the procedure. I called and spoke with the patients . I did inform him of the appointment and NPO along with stopping the xarelto two days prior. I also spoke with him about the Xarelto regimen. The patient is to start with the 15 mg for 21 days then start 20 mg for 9 days. There is another script at the pharmacy for Xarelto 20 mg one tablet by mouth daily, a 30 day supply with 5 refills. He acknowledged understanding and thanked me for the call.

## 2022-08-09 ENCOUNTER — HOSPITAL ENCOUNTER (OUTPATIENT)
Dept: INTERVENTIONAL RADIOLOGY/VASCULAR | Age: 67
Discharge: HOME | End: 2022-08-09
Attending: STUDENT IN AN ORGANIZED HEALTH CARE EDUCATION/TRAINING PROGRAM | Admitting: STUDENT IN AN ORGANIZED HEALTH CARE EDUCATION/TRAINING PROGRAM
Payer: COMMERCIAL

## 2022-08-09 VITALS
HEART RATE: 85 BPM | TEMPERATURE: 99.2 F | HEIGHT: 63 IN | SYSTOLIC BLOOD PRESSURE: 110 MMHG | DIASTOLIC BLOOD PRESSURE: 77 MMHG | BODY MASS INDEX: 24.8 KG/M2 | OXYGEN SATURATION: 94 % | WEIGHT: 140 LBS | RESPIRATION RATE: 23 BRPM

## 2022-08-09 DIAGNOSIS — Z86.711 HX OF PULMONARY EMBOLUS: ICD-10-CM

## 2022-08-09 PROCEDURE — 74011250636 HC RX REV CODE- 250/636: Performed by: STUDENT IN AN ORGANIZED HEALTH CARE EDUCATION/TRAINING PROGRAM

## 2022-08-09 PROCEDURE — 2709999900 HC NON-CHARGEABLE SUPPLY

## 2022-08-09 PROCEDURE — C1894 INTRO/SHEATH, NON-LASER: HCPCS

## 2022-08-09 PROCEDURE — C1773 RET DEV, INSERTABLE: HCPCS

## 2022-08-09 PROCEDURE — C1769 GUIDE WIRE: HCPCS

## 2022-08-09 PROCEDURE — 76942 ECHO GUIDE FOR BIOPSY: CPT

## 2022-08-09 RX ORDER — SODIUM CHLORIDE 9 MG/ML
50 INJECTION, SOLUTION INTRAVENOUS CONTINUOUS
Status: DISCONTINUED | OUTPATIENT
Start: 2022-08-09 | End: 2022-08-09 | Stop reason: HOSPADM

## 2022-08-09 RX ORDER — LIDOCAINE HYDROCHLORIDE 20 MG/ML
20 INJECTION, SOLUTION INFILTRATION; PERINEURAL
Status: DISCONTINUED | OUTPATIENT
Start: 2022-08-09 | End: 2022-08-09 | Stop reason: HOSPADM

## 2022-08-09 RX ORDER — FENTANYL CITRATE 50 UG/ML
200 INJECTION, SOLUTION INTRAMUSCULAR; INTRAVENOUS
Status: DISCONTINUED | OUTPATIENT
Start: 2022-08-09 | End: 2022-08-09 | Stop reason: ALTCHOICE

## 2022-08-09 RX ORDER — MIDAZOLAM HYDROCHLORIDE 1 MG/ML
5 INJECTION, SOLUTION INTRAMUSCULAR; INTRAVENOUS
Status: DISCONTINUED | OUTPATIENT
Start: 2022-08-09 | End: 2022-08-09 | Stop reason: ALTCHOICE

## 2022-08-09 RX ADMIN — FENTANYL CITRATE 25 MCG: 50 INJECTION, SOLUTION INTRAMUSCULAR; INTRAVENOUS at 08:37

## 2022-08-09 RX ADMIN — SODIUM CHLORIDE 50 ML/HR: 9 INJECTION, SOLUTION INTRAVENOUS at 08:33

## 2022-08-09 RX ADMIN — FENTANYL CITRATE 25 MCG: 50 INJECTION, SOLUTION INTRAMUSCULAR; INTRAVENOUS at 08:33

## 2022-08-09 RX ADMIN — MIDAZOLAM 1 MG: 1 INJECTION INTRAMUSCULAR; INTRAVENOUS at 08:34

## 2022-08-09 NOTE — PROGRESS NOTES
Pt amb to IR for scheduled procedure. All assessments completed, procedure  and all questions reviewed withy understanding.

## 2022-08-09 NOTE — H&P
Radiology History and Physical    Patient: Particia Specter 69 y.o. female       Chief Complaint: No chief complaint on file. History of Present Illness: IVC filter removal.    History:    Past Medical History:   Diagnosis Date    Hypertension      No family history on file. Social History     Socioeconomic History    Marital status:      Spouse name: Not on file    Number of children: Not on file    Years of education: Not on file    Highest education level: Not on file   Occupational History    Not on file   Tobacco Use    Smoking status: Every Day    Smokeless tobacco: Never   Substance and Sexual Activity    Alcohol use: Yes     Comment: most days    Drug use: Yes     Types: Marijuana    Sexual activity: Not on file   Other Topics Concern    Not on file   Social History Narrative    Not on file     Social Determinants of Health     Financial Resource Strain: Not on file   Food Insecurity: Not on file   Transportation Needs: Not on file   Physical Activity: Not on file   Stress: Not on file   Social Connections: Not on file   Intimate Partner Violence: Not on file   Housing Stability: Not on file       Allergies: Allergies   Allergen Reactions    Penicillins Unknown (comments)       Current Medications:  Current Facility-Administered Medications   Medication Dose Route Frequency    fentaNYL citrate (PF) injection 200 mcg  200 mcg IntraVENous Rad Multiple    midazolam (VERSED) injection 5 mg  5 mg IntraVENous Rad Multiple    0.9% sodium chloride infusion  50 mL/hr IntraVENous CONTINUOUS    lidocaine (XYLOCAINE) 20 mg/mL (2 %) injection 400 mg  20 mL SubCUTAneous RAD ONCE    iopamidoL (ISOVUE 300) 61 % contrast injection 100 mL  100 mL IntraVENous RAD ONCE        Physical Exam:  Blood pressure (!) 111/90, pulse 99, temperature 99.2 °F (37.3 °C), resp. rate 19, height 5' 3\" (1.6 m), weight 63.5 kg (140 lb), SpO2 96 %.   GENERAL: alert, cooperative, no distress, appears stated age  LUNG: clear to auscultation bilaterally  HEART: regular rate and rhythm  ABD: Non tender, non distended. Alerts:    Hospital Problems  Date Reviewed: 8/1/2022   None      Laboratory:    No results for input(s): HGB, HCT, WBC, PLT, INR, BUN, CREA, K, CRCLT, HGBEXT, HCTEXT, PLTEXT, INREXT in the last 72 hours. No lab exists for component: PTT, PT      Plan of Care/Planned Procedure:  Risks, benefits, and alternatives reviewed with patient and she agrees to proceed with the procedure. Deemed appropriate for moderate sedation with versed and fentanyl.       Aylin Ascencio MD

## 2022-08-09 NOTE — DISCHARGE INSTRUCTIONS
You have received sedation medications today. YOU SHOULD NOT DRIVE FOR 24 HOURS, DO NOT OPERATE HEAVY MACHINERY, DO NOT MAKE ANY LEGAL DECISIONS OR SIGN LEGAL DOCUMENTS FOR 24 HOURS. DO NOT DRINK ALCOHOL, TAKE ANY MEDICATIONS UNLESS PRESCRIBED BY YOUR DOCTOR. IF YOU ARE A CAREGIVER, SOMEONE SHOULD TAKE THAT ROLE FOR 24 HOURS. Side effects of sedation medications and other medications used today have been reviewed  Those side effects can include but are not limited to: dizziness, drowsiness, poor balance, fatigue, sleepiness. Take precautions at home to prevent falls, such as assistance with walking or stairs if allowed and /or when needed or position changes. Allergic or adverse reactions could include nausea, itching, hives, dizziness, or anything else out of the ordinary. Should you experience any of these significant changes, please call [phone] between the hours of 7:30 am and 3:30 pm or [phone] after hours. After hours, ask the  to page the X-ray Technologist, and describe the problem to the technologist. If you are experiencing chest pain, shortness of breath, altered mental state, unusual bleeding or any other emergent symptom you should call 911 immediately.

## 2022-08-09 NOTE — PROGRESS NOTES
Radilogy MD at bedside for pre procedure consult and consent. Family member present. Giovanni Muse reviewed with understanding. Consent obtained and witnessed by RN. MD aware of all patients current medications and lab value results.

## 2022-08-09 NOTE — PROGRESS NOTES
Discharge instructions have been reviewed with patient and . Copy given to patient. Pt verbalized understand of instructions and was given  the opportunity to ask questions and receive answers prior to leaving. Pt discharged with family and assisted out by RN in wheelchair.

## 2022-08-12 ENCOUNTER — HOSPITAL ENCOUNTER (EMERGENCY)
Age: 67
Discharge: HOME | End: 2022-08-12
Attending: EMERGENCY MEDICINE
Payer: COMMERCIAL

## 2022-08-12 VITALS
OXYGEN SATURATION: 97 % | HEART RATE: 99 BPM | BODY MASS INDEX: 25.89 KG/M2 | SYSTOLIC BLOOD PRESSURE: 131 MMHG | RESPIRATION RATE: 16 BRPM | WEIGHT: 146.16 LBS | DIASTOLIC BLOOD PRESSURE: 101 MMHG | TEMPERATURE: 98.2 F

## 2022-08-12 DIAGNOSIS — R11.0 NAUSEA IN ADULT: ICD-10-CM

## 2022-08-12 DIAGNOSIS — K06.8 BLEEDING GUMS: Primary | ICD-10-CM

## 2022-08-12 LAB
ALBUMIN SERPL-MCNC: 2.9 G/DL (ref 3.5–5)
ALBUMIN/GLOB SERPL: 0.6 {RATIO} (ref 1.1–2.2)
ALP SERPL-CCNC: 306 U/L (ref 45–117)
ALT SERPL-CCNC: 56 U/L (ref 12–78)
ANION GAP SERPL CALC-SCNC: 9 MMOL/L (ref 5–15)
AST SERPL-CCNC: 50 U/L (ref 15–37)
BASOPHILS # BLD: 0.1 K/UL (ref 0–0.1)
BASOPHILS NFR BLD: 1 % (ref 0–1)
BILIRUB SERPL-MCNC: 0.5 MG/DL (ref 0.2–1)
BUN SERPL-MCNC: 10 MG/DL (ref 6–20)
BUN/CREAT SERPL: 13 (ref 12–20)
CALCIUM SERPL-MCNC: 9.2 MG/DL (ref 8.5–10.1)
CHLORIDE SERPL-SCNC: 100 MMOL/L (ref 97–108)
CO2 SERPL-SCNC: 25 MMOL/L (ref 21–32)
CREAT SERPL-MCNC: 0.78 MG/DL (ref 0.55–1.02)
DIFFERENTIAL METHOD BLD: ABNORMAL
EOSINOPHIL # BLD: 0.1 K/UL (ref 0–0.4)
EOSINOPHIL NFR BLD: 2 % (ref 0–7)
ERYTHROCYTE [DISTWIDTH] IN BLOOD BY AUTOMATED COUNT: 16.9 % (ref 11.5–14.5)
GLOBULIN SER CALC-MCNC: 5.2 G/DL (ref 2–4)
GLUCOSE SERPL-MCNC: 94 MG/DL (ref 65–100)
HCT VFR BLD AUTO: 37.4 % (ref 35–47)
HGB BLD-MCNC: 11.7 G/DL (ref 11.5–16)
IMM GRANULOCYTES # BLD AUTO: 0 K/UL (ref 0–0.04)
IMM GRANULOCYTES NFR BLD AUTO: 0 % (ref 0–0.5)
LIPASE SERPL-CCNC: 143 U/L (ref 73–393)
LYMPHOCYTES # BLD: 2.2 K/UL (ref 0.8–3.5)
LYMPHOCYTES NFR BLD: 34 % (ref 12–49)
MCH RBC QN AUTO: 28.6 PG (ref 26–34)
MCHC RBC AUTO-ENTMCNC: 31.3 G/DL (ref 30–36.5)
MCV RBC AUTO: 91.4 FL (ref 80–99)
MONOCYTES # BLD: 0.6 K/UL (ref 0–1)
MONOCYTES NFR BLD: 9 % (ref 5–13)
NEUTS SEG # BLD: 3.4 K/UL (ref 1.8–8)
NEUTS SEG NFR BLD: 54 % (ref 32–75)
NRBC # BLD: 0 K/UL (ref 0–0.01)
NRBC BLD-RTO: 0 PER 100 WBC
PLATELET # BLD AUTO: 555 K/UL (ref 150–400)
PLATELET COMMENTS,PCOM: ABNORMAL
PMV BLD AUTO: 9.3 FL (ref 8.9–12.9)
POTASSIUM SERPL-SCNC: 4.1 MMOL/L (ref 3.5–5.1)
PROT SERPL-MCNC: 8.1 G/DL (ref 6.4–8.2)
RBC # BLD AUTO: 4.09 M/UL (ref 3.8–5.2)
RBC MORPH BLD: ABNORMAL
SODIUM SERPL-SCNC: 134 MMOL/L (ref 136–145)
TROPONIN-HIGH SENSITIVITY: 6 NG/L (ref 0–51)
WBC # BLD AUTO: 6.4 K/UL (ref 3.6–11)

## 2022-08-12 PROCEDURE — 83690 ASSAY OF LIPASE: CPT

## 2022-08-12 PROCEDURE — 99284 EMERGENCY DEPT VISIT MOD MDM: CPT

## 2022-08-12 PROCEDURE — 84484 ASSAY OF TROPONIN QUANT: CPT

## 2022-08-12 PROCEDURE — 96374 THER/PROPH/DIAG INJ IV PUSH: CPT

## 2022-08-12 PROCEDURE — 36415 COLL VENOUS BLD VENIPUNCTURE: CPT

## 2022-08-12 PROCEDURE — 74011250636 HC RX REV CODE- 250/636: Performed by: EMERGENCY MEDICINE

## 2022-08-12 PROCEDURE — 93005 ELECTROCARDIOGRAM TRACING: CPT

## 2022-08-12 PROCEDURE — 80053 COMPREHEN METABOLIC PANEL: CPT

## 2022-08-12 PROCEDURE — 85025 COMPLETE CBC W/AUTO DIFF WBC: CPT

## 2022-08-12 RX ORDER — ONDANSETRON 2 MG/ML
4 INJECTION INTRAMUSCULAR; INTRAVENOUS
Status: COMPLETED | OUTPATIENT
Start: 2022-08-12 | End: 2022-08-12

## 2022-08-12 RX ORDER — OXYCODONE HYDROCHLORIDE 5 MG/1
5 CAPSULE ORAL
COMMUNITY

## 2022-08-12 RX ADMIN — ONDANSETRON HYDROCHLORIDE 4 MG: 2 SOLUTION INTRAMUSCULAR; INTRAVENOUS at 16:25

## 2022-08-12 NOTE — ED TRIAGE NOTES
Patient arrives with c/o nausea for 2 weeks and bleeding gums that started today. Patient on blood thinners.

## 2022-08-12 NOTE — ED PROVIDER NOTES
64-year-old female,  with a history of prior pulm embolism, on Xarelto, with recent admission for splenic laceration after colonoscopy, presents to the emergency department stating that she just started back on her Xarelto after she had her IVC filter removed recently and she stated that she had some bleeding from her gums this morning after eating breakfast.  She denies any further bleeding currently. She also states that she has had nausea for the last 2 weeks ever since she was admitted to the hospital for the splenic laceration. She has an appointment scheduled on Monday for repeat imaging and follow-up with her surgeon. She denies any vomiting, diarrhea, blood in stool, chest pain, shortness of breath, fever, chills, or any other medical concerns. She states that ever since her splenic laceration she has had some epigastric tenderness but denies any new tenderness or worsening tenderness. She states that she has a history of gum disease and used to take chronic doxycycline but states that she was recently taken off of that. She has not followed up with a dentist.  She denies any dental pain or facial swelling. No trauma to the area. She states that she ate cereal this morning and noticed her symptoms after that. Other  Associated symptoms include abdominal pain. Pertinent negatives include no chest pain, no headaches and no shortness of breath. Nausea   Associated symptoms include abdominal pain. Pertinent negatives include no chills, no fever, no diarrhea, no headaches, no arthralgias, no myalgias, no cough and no headaches. Past Medical History:   Diagnosis Date    Hypertension        Past Surgical History:   Procedure Laterality Date    IR [facility] W SI  7/17/2022    IR PLC IVC FILTER  7/15/2022    IR REMOVE IVC FILTER W SI  8/9/2022         History reviewed. No pertinent family history.     Social History     Socioeconomic History    Marital status:      Spouse name: Not on file    Number of children: Not on file    Years of education: Not on file    Highest education level: Not on file   Occupational History    Not on file   Tobacco Use    Smoking status: Former     Types: Cigarettes     Passive exposure: Past    Smokeless tobacco: Never   Vaping Use    Vaping Use: Never used   Substance and Sexual Activity    Alcohol use: Not Currently    Drug use: Not Currently     Types: Marijuana    Sexual activity: Not on file   Other Topics Concern    Not on file   Social History Narrative    Not on file     Social Determinants of Health     Financial Resource Strain: Not on file   Food Insecurity: Not on file   Transportation Needs: Not on file   Physical Activity: Not on file   Stress: Not on file   Social Connections: Not on file   Intimate Partner Violence: Not on file   Housing Stability: Not on file         ALLERGIES: Penicillins    Review of Systems   Constitutional:  Negative for activity change, appetite change, chills and fever. HENT:  Positive for dental problem. Negative for congestion, rhinorrhea, sinus pressure, sneezing and sore throat. Eyes:  Negative for photophobia and visual disturbance. Respiratory:  Negative for cough and shortness of breath. Cardiovascular:  Negative for chest pain. Gastrointestinal:  Positive for abdominal pain and nausea. Negative for blood in stool, constipation, diarrhea and vomiting. Genitourinary:  Negative for difficulty urinating, dysuria, flank pain, frequency, hematuria, menstrual problem, urgency, vaginal bleeding and vaginal discharge. Musculoskeletal:  Negative for arthralgias, back pain, myalgias and neck pain. Skin:  Negative for rash and wound. Neurological:  Negative for syncope, weakness, numbness and headaches. Psychiatric/Behavioral:  Negative for self-injury and suicidal ideas. All other systems reviewed and are negative.     Vitals:    08/12/22 1554   BP: (!) 131/101   Pulse: 99   Resp: 16   Temp: 98.2 °F (36.8 °C)   SpO2: 94%   Weight: 66.3 kg (146 lb 2.6 oz)            Physical Exam  Vitals and nursing note reviewed. Constitutional:       General: She is not in acute distress. Appearance: Normal appearance. She is well-developed. She is not diaphoretic. Comments: Pleasant, no acute distress. Speaking in full sentences. HENT:      Head: Normocephalic and atraumatic. Nose: Nose normal.      Mouth/Throat:      Mouth: No injury. Dentition: Abnormal dentition. Dental caries present. No dental tenderness or dental abscesses. Comments: Poor dentition with multiple dental caries noted with no noted gum bleeding. Mild gingival erythema throughout but no significant swelling or noted dental abscess. Eyes:      Extraocular Movements: Extraocular movements intact. Conjunctiva/sclera: Conjunctivae normal.      Pupils: Pupils are equal, round, and reactive to light. Cardiovascular:      Rate and Rhythm: Normal rate and regular rhythm. Heart sounds: Normal heart sounds. Pulmonary:      Effort: Pulmonary effort is normal.      Breath sounds: Normal breath sounds. Abdominal:      General: There is no distension. Palpations: Abdomen is soft. Tenderness: There is no abdominal tenderness. Musculoskeletal:         General: No tenderness. Cervical back: Neck supple. Skin:     General: Skin is warm and dry. Neurological:      General: No focal deficit present. Mental Status: She is alert and oriented to person, place, and time. Cranial Nerves: No cranial nerve deficit. Sensory: No sensory deficit. Motor: No weakness. Coordination: Coordination normal.        MDM    68-year-old female presents with transient episode of gum bleeding, has known poor dentition and gum disease and failure this in commendation with her Xarelto because no bleeding. No bleeding currently.   Mild epigastric abdominal pain that has been constant since her recent splenic laceration. No peritonitis or worsening of symptoms. Do not feel that emergent repeat imaging is necessary at this time. Scheduled for outpatient imaging on Monday. Labs returned reassuringly showing no significant abnormalities, Hg is 11.7, no leukocytosis, , normal renal function, unremarkable LFTs, normal lipase, negative troponin. Reassurance was given. She was recommended to follow-up with her surgeon for repeat imaging on Monday as scheduled and was recommended following up with her dentist for further evaluation of her dental care. Return precautions were given for worsening or concerns. This plan was discussed with the patient and her  at the bedside and they stated both understanding and agreement. Please note that this dictation was completed with [employer], the computer voice recognition software. Quite often unanticipated grammatical, syntax, homophones, and other interpretive errors are inadvertently transcribed by the computer software. Please disregard these errors. Please excuse any errors that have escaped final proofreading. Procedures    1607 EKG shows normal sinus rhythm with a rate of 92 bpm with no acute ST or T wave abnormalities suggestive of ischemia.

## 2022-08-14 LAB
ATRIAL RATE: 92 BPM
CALCULATED P AXIS, ECG09: 49 DEGREES
CALCULATED R AXIS, ECG10: -29 DEGREES
CALCULATED T AXIS, ECG11: 53 DEGREES
DIAGNOSIS, 93000: NORMAL
P-R INTERVAL, ECG05: 144 MS
Q-T INTERVAL, ECG07: 360 MS
QRS DURATION, ECG06: 74 MS
QTC CALCULATION (BEZET), ECG08: 445 MS
VENTRICULAR RATE, ECG03: 92 BPM

## 2022-08-19 ENCOUNTER — TRANSCRIBE ORDER (OUTPATIENT)
Dept: SCHEDULING | Age: 67
End: 2022-08-19

## 2022-08-22 ENCOUNTER — TRANSCRIBE ORDER (OUTPATIENT)
Dept: SCHEDULING | Age: 67
End: 2022-08-22

## 2022-08-22 DIAGNOSIS — J90 PLEURAL EFFUSION: Primary | ICD-10-CM

## 2022-08-29 ENCOUNTER — HOSPITAL ENCOUNTER (OUTPATIENT)
Dept: GENERAL RADIOLOGY | Age: 67
Discharge: HOME | End: 2022-08-29
Attending: PHYSICIAN ASSISTANT
Payer: COMMERCIAL

## 2022-08-29 ENCOUNTER — HOSPITAL ENCOUNTER (OUTPATIENT)
Dept: ULTRASOUND IMAGING | Age: 67
Discharge: HOME | End: 2022-08-29
Attending: INTERNAL MEDICINE
Payer: COMMERCIAL

## 2022-08-29 VITALS
OXYGEN SATURATION: 95 % | DIASTOLIC BLOOD PRESSURE: 89 MMHG | HEART RATE: 99 BPM | SYSTOLIC BLOOD PRESSURE: 121 MMHG | RESPIRATION RATE: 18 BRPM | TEMPERATURE: 98.4 F

## 2022-08-29 DIAGNOSIS — J90 PLEURAL EFFUSION: ICD-10-CM

## 2022-08-29 LAB
APPEARANCE FLD: CLEAR
COLOR FLD: YELLOW
EOSINOPHIL NFR FLD MANUAL: 1 %
LYMPHOCYTES NFR FLD: 53 %
MONOS+MACROS NFR FLD: 36 %
NEUTROPHILS NFR FLD: 8 %
NUC CELL # FLD: 479 /CU MM
OTHER CELL,FOTHC: 2 %
RBC # FLD: 60 /CU MM
SPECIMEN SOURCE FLD: ABNORMAL

## 2022-08-29 PROCEDURE — 74011000250 HC RX REV CODE- 250: Performed by: INTERNAL MEDICINE

## 2022-08-29 PROCEDURE — 89050 BODY FLUID CELL COUNT: CPT

## 2022-08-29 PROCEDURE — 32555 ASPIRATE PLEURA W/ IMAGING: CPT

## 2022-08-29 PROCEDURE — 71045 X-RAY EXAM CHEST 1 VIEW: CPT

## 2022-08-29 RX ORDER — SODIUM BICARBONATE 42 MG/ML
2 INJECTION, SOLUTION INTRAVENOUS
Status: COMPLETED | OUTPATIENT
Start: 2022-08-29 | End: 2022-08-29

## 2022-08-29 RX ORDER — LIDOCAINE HYDROCHLORIDE 10 MG/ML
10 INJECTION INFILTRATION; PERINEURAL
Status: COMPLETED | OUTPATIENT
Start: 2022-08-29 | End: 2022-08-29

## 2022-08-29 RX ORDER — DOXYCYCLINE 50 MG/1
20 TABLET ORAL 2 TIMES DAILY
COMMUNITY

## 2022-08-29 RX ADMIN — LIDOCAINE HYDROCHLORIDE 8 ML: 10 INJECTION INFILTRATION; PERINEURAL at 09:26

## 2022-08-29 RX ADMIN — SODIUM BICARBONATE 2 ML: 42 INJECTION, SOLUTION INTRAVENOUS at 09:26

## 2022-08-29 NOTE — ROUTINE PROCESS
Pt arrives via stretcher to ultrasound department accompanied by  for left thoracentesis procedure. All assessments completed and consent was reviewed. Education given was regarding procedure, post-procedure care and  management/follow-up. Opportunity for questions was provided and all questions and concerns were addressed.

## 2022-08-29 NOTE — ROUTINE PROCESS
Pt with uneventful left thoracentesis. Coughing during procedure has subsided and vitals are baseline. Denies pain or SOB. Thoracentesis site shows no signs of bleeding or problems. Verbalized understanding of discharge instructions. Ready for discharge.

## 2022-09-13 ENCOUNTER — TELEPHONE (OUTPATIENT)
Dept: SURGERY | Age: 67
End: 2022-09-13

## 2022-09-13 NOTE — TELEPHONE ENCOUNTER
Received medical request from [facility]. They requested radiology films. Film requests need to go to [facility], not [facility]. I am processing the record request for NON-films. Sending to [facility]. LVM with patient about needing to do a request with radiology for films.

## 2023-04-21 DIAGNOSIS — J90 PLEURAL EFFUSION: Primary | ICD-10-CM

## 2023-05-03 ENCOUNTER — APPOINTMENT (OUTPATIENT)
Dept: CT IMAGING | Age: 68
End: 2023-05-03
Attending: STUDENT IN AN ORGANIZED HEALTH CARE EDUCATION/TRAINING PROGRAM
Payer: MEDICARE

## 2023-05-03 ENCOUNTER — HOSPITAL ENCOUNTER (EMERGENCY)
Age: 68
Discharge: HOME | End: 2023-05-03
Attending: STUDENT IN AN ORGANIZED HEALTH CARE EDUCATION/TRAINING PROGRAM
Payer: MEDICARE

## 2023-05-03 VITALS
SYSTOLIC BLOOD PRESSURE: 148 MMHG | BODY MASS INDEX: 27.3 KG/M2 | WEIGHT: 154.1 LBS | OXYGEN SATURATION: 98 % | RESPIRATION RATE: 16 BRPM | HEART RATE: 56 BPM | TEMPERATURE: 97.8 F | DIASTOLIC BLOOD PRESSURE: 78 MMHG

## 2023-05-03 DIAGNOSIS — K57.32 DIVERTICULITIS OF LARGE INTESTINE WITHOUT PERFORATION OR ABSCESS, UNSPECIFIED BLEEDING STATUS: Primary | ICD-10-CM

## 2023-05-03 LAB
ALBUMIN SERPL-MCNC: 3.3 G/DL (ref 3.5–5)
ALBUMIN/GLOB SERPL: 0.8 (ref 1.1–2.2)
ALP SERPL-CCNC: 73 U/L (ref 45–117)
ALT SERPL-CCNC: 28 U/L (ref 12–78)
ANION GAP SERPL CALC-SCNC: 12 MMOL/L (ref 5–15)
AST SERPL-CCNC: 23 U/L (ref 15–37)
ATRIAL RATE: 58 BPM
BASOPHILS # BLD: 0.1 K/UL (ref 0–0.1)
BASOPHILS NFR BLD: 1 % (ref 0–1)
BILIRUB SERPL-MCNC: 0.8 MG/DL (ref 0.2–1)
BUN SERPL-MCNC: 12 MG/DL (ref 6–20)
BUN/CREAT SERPL: 17 (ref 12–20)
CALCIUM SERPL-MCNC: 9.3 MG/DL (ref 8.5–10.1)
CALCULATED P AXIS, ECG09: 63 DEGREES
CALCULATED R AXIS, ECG10: -42 DEGREES
CALCULATED T AXIS, ECG11: 43 DEGREES
CHLORIDE SERPL-SCNC: 104 MMOL/L (ref 97–108)
CO2 SERPL-SCNC: 26 MMOL/L (ref 21–32)
COMMENT, HOLDF: NORMAL
CREAT SERPL-MCNC: 0.69 MG/DL (ref 0.55–1.02)
DIAGNOSIS, 93000: NORMAL
DIFFERENTIAL METHOD BLD: ABNORMAL
EOSINOPHIL # BLD: 0.1 K/UL (ref 0–0.4)
EOSINOPHIL NFR BLD: 2 % (ref 0–7)
ERYTHROCYTE [DISTWIDTH] IN BLOOD BY AUTOMATED COUNT: 13.3 % (ref 11.5–14.5)
GLOBULIN SER CALC-MCNC: 4.2 G/DL (ref 2–4)
GLUCOSE SERPL-MCNC: 111 MG/DL (ref 65–100)
HCT VFR BLD AUTO: 39.3 % (ref 35–47)
HGB BLD-MCNC: 12.7 G/DL (ref 11.5–16)
IMM GRANULOCYTES # BLD AUTO: 0 K/UL (ref 0–0.04)
IMM GRANULOCYTES NFR BLD AUTO: 0 % (ref 0–0.5)
LIPASE SERPL-CCNC: 51 U/L (ref 73–393)
LYMPHOCYTES # BLD: 1.5 K/UL (ref 0.8–3.5)
LYMPHOCYTES NFR BLD: 30 % (ref 12–49)
MAGNESIUM SERPL-MCNC: 2.2 MG/DL (ref 1.6–2.4)
MCH RBC QN AUTO: 32.3 PG (ref 26–34)
MCHC RBC AUTO-ENTMCNC: 32.3 G/DL (ref 30–36.5)
MCV RBC AUTO: 100 FL (ref 80–99)
MONOCYTES # BLD: 0.4 K/UL (ref 0–1)
MONOCYTES NFR BLD: 8 % (ref 5–13)
NEUTS SEG # BLD: 2.9 K/UL (ref 1.8–8)
NEUTS SEG NFR BLD: 59 % (ref 32–75)
NRBC # BLD: 0 K/UL (ref 0–0.01)
NRBC BLD-RTO: 0 PER 100 WBC
P-R INTERVAL, ECG05: 174 MS
PLATELET # BLD AUTO: 347 K/UL (ref 150–400)
PMV BLD AUTO: 9.7 FL (ref 8.9–12.9)
POTASSIUM SERPL-SCNC: 4.1 MMOL/L (ref 3.5–5.1)
PROT SERPL-MCNC: 7.5 G/DL (ref 6.4–8.2)
Q-T INTERVAL, ECG07: 414 MS
QRS DURATION, ECG06: 78 MS
QTC CALCULATION (BEZET), ECG08: 406 MS
RBC # BLD AUTO: 3.93 M/UL (ref 3.8–5.2)
SAMPLES BEING HELD,HOLD: NORMAL
SODIUM SERPL-SCNC: 142 MMOL/L (ref 136–145)
TROPONIN I SERPL HS-MCNC: 6 NG/L (ref 0–51)
VENTRICULAR RATE, ECG03: 58 BPM
WBC # BLD AUTO: 5 K/UL (ref 3.6–11)

## 2023-05-03 PROCEDURE — 84484 ASSAY OF TROPONIN QUANT: CPT

## 2023-05-03 PROCEDURE — 71275 CT ANGIOGRAPHY CHEST: CPT

## 2023-05-03 PROCEDURE — 83690 ASSAY OF LIPASE: CPT

## 2023-05-03 PROCEDURE — 93005 ELECTROCARDIOGRAM TRACING: CPT

## 2023-05-03 PROCEDURE — 80053 COMPREHEN METABOLIC PANEL: CPT

## 2023-05-03 PROCEDURE — 74011000636 HC RX REV CODE- 636: Performed by: STUDENT IN AN ORGANIZED HEALTH CARE EDUCATION/TRAINING PROGRAM

## 2023-05-03 PROCEDURE — 85025 COMPLETE CBC W/AUTO DIFF WBC: CPT

## 2023-05-03 PROCEDURE — 83735 ASSAY OF MAGNESIUM: CPT

## 2023-05-03 PROCEDURE — 74177 CT ABD & PELVIS W/CONTRAST: CPT

## 2023-05-03 PROCEDURE — 74011250637 HC RX REV CODE- 250/637: Performed by: STUDENT IN AN ORGANIZED HEALTH CARE EDUCATION/TRAINING PROGRAM

## 2023-05-03 PROCEDURE — 99285 EMERGENCY DEPT VISIT HI MDM: CPT

## 2023-05-03 PROCEDURE — 36415 COLL VENOUS BLD VENIPUNCTURE: CPT

## 2023-05-03 RX ORDER — OXYCODONE AND ACETAMINOPHEN 5; 325 MG/1; MG/1
1 TABLET ORAL
Status: COMPLETED | OUTPATIENT
Start: 2023-05-03 | End: 2023-05-03

## 2023-05-03 RX ORDER — MOXIFLOXACIN HYDROCHLORIDE 400 MG/1
400 TABLET ORAL DAILY
Qty: 7 TABLET | Refills: 0 | Status: SHIPPED | OUTPATIENT
Start: 2023-05-03 | End: 2023-05-10

## 2023-05-03 RX ORDER — POLYETHYLENE GLYCOL 3350 17 G/17G
34 POWDER, FOR SOLUTION ORAL 2 TIMES DAILY
Qty: 2040 G | Refills: 0 | Status: SHIPPED | OUTPATIENT
Start: 2023-05-03 | End: 2023-06-02

## 2023-05-03 RX ORDER — ONDANSETRON 4 MG/1
4 TABLET, ORALLY DISINTEGRATING ORAL
Qty: 20 TABLET | Refills: 0 | Status: SHIPPED | OUTPATIENT
Start: 2023-05-03

## 2023-05-03 RX ORDER — OXYCODONE AND ACETAMINOPHEN 5; 325 MG/1; MG/1
1 TABLET ORAL
Qty: 21 TABLET | Refills: 0 | Status: SHIPPED | OUTPATIENT
Start: 2023-05-03 | End: 2023-05-10

## 2023-05-03 RX ADMIN — IOPAMIDOL 100 ML: 755 INJECTION, SOLUTION INTRAVENOUS at 11:08

## 2023-05-03 RX ADMIN — OXYCODONE HYDROCHLORIDE AND ACETAMINOPHEN 1 TABLET: 5; 325 TABLET ORAL at 12:52
